# Patient Record
Sex: MALE | Race: WHITE | NOT HISPANIC OR LATINO | Employment: STUDENT | URBAN - METROPOLITAN AREA
[De-identification: names, ages, dates, MRNs, and addresses within clinical notes are randomized per-mention and may not be internally consistent; named-entity substitution may affect disease eponyms.]

---

## 2017-01-13 ENCOUNTER — OFFICE VISIT (OUTPATIENT)
Dept: ORTHOPEDICS | Facility: CLINIC | Age: 22
End: 2017-01-13

## 2017-01-13 VITALS
SYSTOLIC BLOOD PRESSURE: 150 MMHG | BODY MASS INDEX: 21.01 KG/M2 | DIASTOLIC BLOOD PRESSURE: 76 MMHG | WEIGHT: 169 LBS | HEIGHT: 75 IN

## 2017-01-13 DIAGNOSIS — M79.641 RIGHT HAND PAIN: Primary | ICD-10-CM

## 2017-01-13 NOTE — PROGRESS NOTES
" Subjective:   José Luis Palacios is a right handed 21 year old male who is here for right small finger pain. He states that in late 10/2016 he may have jammed or injured his right fifth digit playing ultimate Frisbee.  He was not seen, did not have an evaluation but has since noted some recent discomfort as he returned to more typical work-type activities.  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  He has done well with limited activity.  However, over Christmas break and more recently he has been doing more grasping and gripping and has noted he has had some discomfort in the right fifth digit MCP joint.  He denies any bruising, swelling or locking.     Background:   Date of injury:  none  Duration of symptoms: 3 months    Prior Evaluation: None    PAST MEDICAL, SOCIAL, SURGICAL AND FAMILY HISTORY: He  has a past medical history of Acne.  He  has past surgical history that includes no history of surgery.  His family history includes Breast Cancer in his paternal grandmother; Coronary Artery Disease in his paternal grandfather; Hypertension in his father; Skin Cancer in his mother.  He reports that he has never smoked. He does not have any smokeless tobacco history on file. He reports that he drinks alcohol. He reports that he does not use illicit drugs.    ALLERGIES: He has No Known Allergies.    CURRENT MEDICATIONS: He has a current medication list which includes the following prescription(s): doxycycline monohydrate, tretinoin, benzoyl peroxide, and clindamycin.     REVIEW OF SYSTEMS: 6 point review of systems is negative except as noted above.     Exam:   /76 mmHg  Ht 6' 2.5\" (1.892 m)  Wt 169 lb (76.658 kg)  BMI 21.41 kg/m2     CONSTITUTIONIAL: healthy, alert and no distress  GAIT: normal  PSYCHIATRIC: affect normal/bright and mentation appears normal.  RIGHT HAND:  Fifth digit:  He has no swelling and no deformity at the MCP joint, PIP joint or DIP joint.  He has no " valgus or varus pain or laxity with stress testing of the MCP joint at 0 or 35 degrees of flexion.  He has full active and passive range of motion at the DIP, PIP and MCP joints.  Ulnar nerve function is tested and intact.  Distal color, motor and sensory is intact.      ASSESSMENT:  Right fifth digit pain of unclear etiology.      PLAN:  He is reassured his radiographs and exam are normal.  He will follow up on a p.r.n. basis.

## 2017-01-13 NOTE — Clinical Note
"  1/13/2017      RE: José Luis Palacios  [address] [address] [address]        Subjective:   José Luis Palacios is a right handed 21 year old male who is here for right small finger pain. He states that in late 10/2016 he may have jammed or injured his right fifth digit playing ultimate Frisbee.  He was not seen, did not have an evaluation but has since noted some recent discomfort as he returned to more typical work-type activities.  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  He has done well with limited activity.  However, over Christmas break and more recently he has been doing more grasping and gripping and has noted he has had some discomfort in the right fifth digit MCP joint.  He denies any bruising, swelling or locking.     Background:   Date of injury:  none  Duration of symptoms: 3 months    Prior Evaluation: None    PAST MEDICAL, SOCIAL, SURGICAL AND FAMILY HISTORY: He  has a past medical history of Acne.  He  has past surgical history that includes no history of surgery.  His family history includes Breast Cancer in his paternal grandmother; Coronary Artery Disease in his paternal grandfather; Hypertension in his father; Skin Cancer in his mother.  He reports that he has never smoked. He does not have any smokeless tobacco history on file. He reports that he drinks alcohol. He reports that he does not use illicit drugs.    ALLERGIES: He has No Known Allergies.    CURRENT MEDICATIONS: He has a current medication list which includes the following prescription(s): doxycycline monohydrate, tretinoin, benzoyl peroxide, and clindamycin.     REVIEW OF SYSTEMS: 6 point review of systems is negative except as noted above.     Exam:   /76 mmHg  Ht 6' 2.5\" (1.892 m)  Wt 169 lb (76.658 kg)  BMI 21.41 kg/m2     CONSTITUTIONIAL: healthy, alert and no distress  GAIT: normal  PSYCHIATRIC: affect normal/bright and mentation appears normal.  RIGHT HAND:  Fifth digit:  " He has no swelling and no deformity at the MCP joint, PIP joint or DIP joint.  He has no valgus or varus pain or laxity with stress testing of the MCP joint at 0 or 35 degrees of flexion.  He has full active and passive range of motion at the DIP, PIP and MCP joints.  Ulnar nerve function is tested and intact.  Distal color, motor and sensory is intact.      ASSESSMENT:  Right fifth digit pain of unclear etiology.      PLAN:  He is reassured his radiographs and exam are normal.  He will follow up on a p.r.n. basis.           Terence Elliott MD

## 2017-01-13 NOTE — Clinical Note
Date:January 16, 2017      Patient was self referred, no letter generated. Do not send.        [facility] Health Information

## 2017-02-03 ENCOUNTER — OFFICE VISIT (OUTPATIENT)
Dept: URGENT CARE | Facility: URGENT CARE | Age: 22
End: 2017-02-03
Payer: COMMERCIAL

## 2017-02-03 VITALS
SYSTOLIC BLOOD PRESSURE: 150 MMHG | DIASTOLIC BLOOD PRESSURE: 78 MMHG | OXYGEN SATURATION: 100 % | BODY MASS INDEX: 21 KG/M2 | WEIGHT: 168.9 LBS | HEIGHT: 75 IN | HEART RATE: 67 BPM | TEMPERATURE: 98.2 F

## 2017-02-03 DIAGNOSIS — M76.61 TENDONITIS, ACHILLES, RIGHT: Primary | ICD-10-CM

## 2017-02-03 PROCEDURE — 99212 OFFICE O/P EST SF 10 MIN: CPT | Performed by: FAMILY MEDICINE

## 2017-02-03 NOTE — PATIENT INSTRUCTIONS
Apply ice for 15 minutes at a time, every 3-4 hours while awake.     Apply padding over the painful area to avoid friction and rubbing against the shoe edge.     Ibuprofen, Tylenol for the pain.     follow up with a podiatrist if not better in 2-3 weeks.     Avoid any shoes that rub too much against the heel.

## 2017-02-03 NOTE — MR AVS SNAPSHOT
After Visit Summary   2/3/2017    José Luis Palacios    MRN: [id number]           Patient Information     Date Of Birth          1995        Visit Information        Provider Department      2/3/2017 10:30 AM Tio Dennis MD [facility]        Today's Diagnoses     Tendonitis, Achilles, right    -  1       Care Instructions      Apply ice for 15 minutes at a time, every 3-4 hours while awake.     Apply padding over the painful area to avoid friction and rubbing against the shoe edge.     Ibuprofen, Tylenol for the pain.     follow up with a podiatrist if not better in 2-3 weeks.     Avoid any shoes that rub too much against the heel.              Follow-ups after your visit        Your next 10 appointments already scheduled     Feb 24, 2017  9:00 AM   (Arrive by 8:45 AM)   Return Visit with Ricardo Jimenez MD   [facility] Dermatology ([facility])    [address] [place] [id number] [phone]              Who to contact     If you have questions or need follow up information about today's clinic visit or your schedule please contact [facility] directly at [phone].  Normal or non-critical lab and imaging results will be communicated to you by MyChart, letter or phone within 4 business days after the clinic has received the results. If you do not hear from us within 7 days, please contact the clinic through MyChart or phone. If you have a critical or abnormal lab result, we will notify you by phone as soon as possible.  Submit refill requests through [employer] or call your pharmacy and they will forward the refill request to us. Please allow 3 business days for your refill to be completed.          Additional Information About Your Visit        MyChart Information     [employer] gives you secure access to your electronic health record. If you see a primary care provider, you can also send messages to your care  "team and make appointments. If you have questions, please call your primary care clinic.  If you do not have a primary care provider, please call [phone] and they will assist you.        Care EveryWhere ID     This is your Care EveryWhere ID. This could be used by other organizations to access your [facility] medical records  [id number]        Your Vitals Were     Pulse Temperature Height BMI (Body Mass Index) Pulse Oximetry       67 98.2  F (36.8  C) (Oral) 6' 2.5\" (1.892 m) 21.40 kg/m2 100%        Blood Pressure from Last 3 Encounters:   02/03/17 150/78   01/13/17 150/76   05/31/16 122/68    Weight from Last 3 Encounters:   02/03/17 168 lb 14.4 oz (76.613 kg)   01/13/17 169 lb (76.658 kg)   05/31/16 166 lb (75.297 kg)              Today, you had the following     No orders found for display         Today's Medication Changes          These changes are accurate as of: 2/3/17 10:53 AM.  If you have any questions, ask your nurse or doctor.               Stop taking these medicines if you haven't already. Please contact your care team if you have questions.     doxycycline Monohydrate 50 MG Caps capsule   Stopped by:  Tio Dennis MD                    Primary Care Provider    None Specified       No primary provider on file.        Thank you!     Thank you for choosing [facility] URGENT [facility]  for your care. Our goal is always to provide you with excellent care. Hearing back from our patients is one way we can continue to improve our services. Please take a few minutes to complete the written survey that you may receive in the mail after your visit with us. Thank you!             Your Updated Medication List - Protect others around you: Learn how to safely use, store and throw away your medicines at www.disposemymeds.org.          This list is accurate as of: 2/3/17 10:53 AM.  Always use your most recent med list.                   Brand Name Dispense Instructions for use    benzoyl peroxide 5 % Liqd      Use " daily as directed       clindamycin 1 % lotion    CLEOCIN T    60 mL    Apply topically 2 times daily       tretinoin 0.05 % cream    RETIN-A    45 g    Spread a pea size amount into affected area topically at bedtime.  Use sunscreen SPF>20.

## 2017-02-03 NOTE — PROGRESS NOTES
"SUBJECTIVE:  Chief Complaint   Patient presents with     Foot Injury     pt c/o right foot pain. has lump on right achilles. developed 2 days ago     José Luis Palacios is a 21 year old male presents with a chief complaint of a painful lump at the right posterior heel.  Patient has been wearing new tennis shoes since he received them on [place] day.   The lesion and pain occurred two days ago. . No obvious injury.  The patient complained of some pain  and has not had decreased ROM.  Pain exacerbated by walking with shoes on.  .  Relieved by not wearing shoes.  He treated it initially with Ice. This is the first time this type of injury has occurred to this patient.     Past Medical History   Diagnosis Date     Acne      Current Outpatient Prescriptions   Medication Sig Dispense Refill     tretinoin (RETIN-A) 0.05 % cream Spread a pea size amount into affected area topically at bedtime.  Use sunscreen SPF>20. 45 g 11     benzoyl peroxide 5 % LIQD Use daily as directed       clindamycin (CLEOCIN T) 1 % lotion Apply topically 2 times daily 60 mL 11     Social History   Substance Use Topics     Smoking status: Never Smoker      Smokeless tobacco: Never Used     Alcohol Use: 0.0 oz/week     0 Standard drinks or equivalent per week      Comment: 1-4x weekly       ROS:  Review of systems negative except as stated above.    EXAM:   /78 mmHg  Pulse 67  Temp(Src) 98.2  F (36.8  C) (Oral)  Ht 6' 2.5\" (1.892 m)  Wt 168 lb 14.4 oz (76.613 kg)  BMI 21.40 kg/m2  SpO2 100%  Gen: healthy,alert,no distress  Extremity: Right distal Achilles tendon has a tender, mildly edematous area overlying and slightly lateral to the this area of the Achilles tendon.  No fluctuance.  .   SKIN: there is mild erythema without no overlying cuts nor abrasions.      X-RAY was not done.      ASSESSMENT:   Right Heel Pain  Achilles Tendinitis of the right heel.     PLAN:  1) Ice, Padding over the painful area  2) Avoid shoes that rub " against the lesion  3) follow up with a podiatrist if not better in 2-3 weeks    Tio Dennis MD

## 2017-02-24 ENCOUNTER — OFFICE VISIT (OUTPATIENT)
Dept: DERMATOLOGY | Facility: CLINIC | Age: 22
End: 2017-02-24

## 2017-02-24 DIAGNOSIS — D22.9 MULTIPLE BENIGN MELANOCYTIC NEVI: ICD-10-CM

## 2017-02-24 DIAGNOSIS — L70.0 ACNE VULGARIS: Primary | ICD-10-CM

## 2017-02-24 DIAGNOSIS — Z12.83 SCREENING EXAM FOR SKIN CANCER: ICD-10-CM

## 2017-02-24 ASSESSMENT — PAIN SCALES - GENERAL: PAINLEVEL: NO PAIN (0)

## 2017-02-24 NOTE — LETTER
Date:March 22, 2017      Patient was self referred, no letter generated. Do not send.        [facility] Health Information

## 2017-02-24 NOTE — LETTER
2/24/2017       RE: José Luis Palacios  [address] [address] [address]     Dear Colleague,    Thank you for referring your patient, José Luis Palacios, to the [facility] DERMATOLOGY at [facility]. Please see a copy of my visit note below.    [facility] Dermatology Note    Dermatology Problem List:  1. Acne vulgaris  -benzoyl peroxide 5% face wash, initiated 8/23/16  -tretinoin 0.05% cream, initiated 8/23/16  -clindamycin 1% lotion, initiated 8/23/16  -doxycycline 50 mg bid, initiated 11/22/16, stopped 2/2017  2. Facial xerosis    Encounter Date: Feb 24, 2017    CC:  Chief Complaint   Patient presents with     Derm Problem     José Luis is here today for a skin check. no areas of concern.     History of Present Illness:  Mr. José Luis Palacios is a 21 year old male who presents as a follow up for acne.     The patient was last seen 3 months ago when he reported improvement but insufficient control of acne from his perspective. We agreed to a limited term course of oral antibiotic, DCN specifically. He did have pill esophagitis and discontinued, but was able to restart. He has since stopped this again as his skin was well controlled. He would like to continue topical medications.    He presents today also for a skin check. Denies painful, bleeding, nonhealing lesions. No new or changing nevi.    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 He has been living at home with his parents and dog this summer and will live near campus during the school year.    Family History:  There is a family history of skin cancer in the  patient's mother, patient not sure what type.    Medications:  Current Outpatient Prescriptions   Medication Sig Dispense Refill     tretinoin (RETIN-A) 0.05 % cream Spread a pea size amount into affected area topically at bedtime.  Use sunscreen SPF>20. 45 g 11     benzoyl peroxide 5 % LIQD Use daily as directed       clindamycin (CLEOCIN T) 1 % lotion Apply topically 2 times daily 60 mL 11     No Known Allergies    Review of Systems:  -Skin: As above in HPI. No additional skin concerns.    Physical exam:  Vitals: There were no vitals taken for this visit.  GEN: This is a well developed, well-nourished male in no acute distress, in a pleasant mood.   SKIN: Acne exam, which includes the face, neck, upper central chest, and upper central back was performed.  - There are post inflammatory macules on the shoulder  - Scattered excoriations and postinflammatory erythematous macules; no active papules, pustules, nodules or cysts on the face  - scattered junctional and compound nevi on the trunk and proximal extremities without clinical atypia; signature network phenotype is reticular.  - No other lesions of concern on areas examined.     Impression/Plan:  1. Acne vulgaris, comedonal and inflammatory - improved; transitioning to topical-only management  - Continue benzoyl peroxide 5% face wash when showering.  - Continue tretinoin 0.05% cream - apply a pea sized amount to a clean, dry face every other night   - Continue clindamycin 1% lotion - apply twice daily PRN inflammatory lesions, otherwise may stop this    2. Perinasal erythema and scaling, suspect xerotic dermatitis due to retinoid use (based on patient history).   - Resolved today.  - Continue non-comedogenic face lotion PRN.    3. Benign melanocytic nevi of the trunk  - reassurance provided; no lesions concerning for malignancy  - photoprotection (regular use of SPF30+ broad spectrum sunscreen and sun protective clothing) recommended  - ABCDE of melanoma  discussed    Follow-up in 6-12 months for acne follow up and annual skin check, earlier for new or changing lesions.    Ricardo Jimenez MD  Staff Dermatologist    Department of Dermatology      Again, thank you for allowing me to participate in the care of your patient.      Sincerely,    Ricardo Jimenez MD

## 2017-02-24 NOTE — NURSING NOTE
Chief Complaint   Patient presents with     Derm Problem     José Luis is here today for a skin check. no areas of concern.     Mannie Moraes, FIONAN

## 2017-02-24 NOTE — MR AVS SNAPSHOT
After Visit Summary   2/24/2017    José Luis Palacios    MRN: [id number]           Patient Information     Date Of Birth          1995        Visit Information        Provider Department      2/24/2017 9:00 AM Ricardo Jimenez MD [facility] Dermatology        Today's Diagnoses     Acne vulgaris    -  1    Multiple benign melanocytic nevi        Screening exam for skin cancer          Care Instructions    The ABCDEs of Melanoma    Skin cancer can develop anywhere on the skin. Ask someone for help when checking your skin, especially in hard to see places. If you notice a mole different from others, or that changes, enlarges, itches, or bleeds (even if it is small), you should see a dermatologist.                Follow-ups after your visit        Follow-up notes from your care team     Return in about 1 year (around 2/24/2018).      Who to contact     Please call your clinic at [phone] to:    Ask questions about your health    Make or cancel appointments    Discuss your medicines    Learn about your test results    Speak to your doctor   If you have compliments or concerns about an experience at your clinic, or if you wish to file a complaint, please contact [facility] Patient Relations at [phone] or email us at [email or web address]         Additional Information About Your Visit        MyChart Information     [employer] gives you secure access to your electronic health record. If you see a primary care provider, you can also send messages to your care team and make appointments. If you have questions, please call your primary care clinic.  If you do not have a primary care provider, please call [phone] and they will assist you.      [employer] is an electronic gateway that provides easy, online access to your medical records. With [employer], you can request a clinic appointment, read your test results, renew a prescription or communicate with your  care team.     To access your existing account, please contact your [facility] or call [phone] for assistance.        Care EveryWhere ID     This is your Care EveryWhere ID. This could be used by other organizations to access your [place] medical records  [id number]         Blood Pressure from Last 3 Encounters:   02/03/17 150/78   01/13/17 150/76   05/31/16 122/68    Weight from Last 3 Encounters:   02/03/17 76.6 kg (168 lb 14.4 oz)   01/13/17 76.7 kg (169 lb)   05/31/16 75.3 kg (166 lb)              Today, you had the following     No orders found for display       Primary Care Provider    None Specified       No primary provider on file.        Thank you!     Thank you for choosing [facility] DERMATOLOGY  for your care. Our goal is always to provide you with excellent care. Hearing back from our patients is one way we can continue to improve our services. Please take a few minutes to complete the written survey that you may receive in the mail after your visit with us. Thank you!             Your Updated Medication List - Protect others around you: Learn how to safely use, store and throw away your medicines at www.disposemymeds.org.          This list is accurate as of: 2/24/17 11:59 PM.  Always use your most recent med list.                   Brand Name Dispense Instructions for use    benzoyl peroxide 5 % Liqd      Use daily as directed       clindamycin 1 % lotion    CLEOCIN T    60 mL    Apply topically 2 times daily       tretinoin 0.05 % cream    RETIN-A    45 g    Spread a pea size amount into affected area topically at bedtime.  Use sunscreen SPF>20.

## 2017-02-24 NOTE — PATIENT INSTRUCTIONS
The ABCDEs of Melanoma    Skin cancer can develop anywhere on the skin. Ask someone for help when checking your skin, especially in hard to see places. If you notice a mole different from others, or that changes, enlarges, itches, or bleeds (even if it is small), you should see a dermatologist.

## 2017-03-22 NOTE — PROGRESS NOTES
[facility] Dermatology Note    Dermatology Problem List:  1. Acne vulgaris  -benzoyl peroxide 5% face wash, initiated 8/23/16  -tretinoin 0.05% cream, initiated 8/23/16  -clindamycin 1% lotion, initiated 8/23/16  -doxycycline 50 mg bid, initiated 11/22/16, stopped 2/2017  2. Facial xerosis    Encounter Date: Feb 24, 2017    CC:  Chief Complaint   Patient presents with     Derm Problem     José Luis is here today for a skin check. no areas of concern.     History of Present Illness:  Mr. José Luis Palacios is a 21 year old male who presents as a follow up for acne.     The patient was last seen 3 months ago when he reported improvement but insufficient control of acne from his perspective. We agreed to a limited term course of oral antibiotic, DCN specifically. He did have pill esophagitis and discontinued, but was able to restart. He has since stopped this again as his skin was well controlled. He would like to continue topical medications.    He presents today also for a skin check. Denies painful, bleeding, nonhealing lesions. No new or changing nevi.    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 He has been living at home with his parents and dog this summer and will live near campus during the school year.    Family History:  There is a family history of skin cancer in the patient's mother, patient not sure what type.    Medications:  Current Outpatient Prescriptions   Medication Sig Dispense Refill     tretinoin (RETIN-A) 0.05 % cream Spread a pea size amount into affected area topically at bedtime.  Use sunscreen SPF>20. 45 g 11     benzoyl peroxide 5 % LIQD Use daily  as directed       clindamycin (CLEOCIN T) 1 % lotion Apply topically 2 times daily 60 mL 11     No Known Allergies    Review of Systems:  -Skin: As above in HPI. No additional skin concerns.    Physical exam:  Vitals: There were no vitals taken for this visit.  GEN: This is a well developed, well-nourished male in no acute distress, in a pleasant mood.   SKIN: Acne exam, which includes the face, neck, upper central chest, and upper central back was performed.  - There are post inflammatory macules on the shoulder  - Scattered excoriations and postinflammatory erythematous macules; no active papules, pustules, nodules or cysts on the face  - scattered junctional and compound nevi on the trunk and proximal extremities without clinical atypia; signature network phenotype is reticular.  - No other lesions of concern on areas examined.     Impression/Plan:  1. Acne vulgaris, comedonal and inflammatory - improved; transitioning to topical-only management  - Continue benzoyl peroxide 5% face wash when showering.  - Continue tretinoin 0.05% cream - apply a pea sized amount to a clean, dry face every other night   - Continue clindamycin 1% lotion - apply twice daily PRN inflammatory lesions, otherwise may stop this    2. Perinasal erythema and scaling, suspect xerotic dermatitis due to retinoid use (based on patient history).   - Resolved today.  - Continue non-comedogenic face lotion PRN.    3. Benign melanocytic nevi of the trunk  - reassurance provided; no lesions concerning for malignancy  - photoprotection (regular use of SPF30+ broad spectrum sunscreen and sun protective clothing) recommended  - ABCDE of melanoma discussed    Follow-up in 6-12 months for acne follow up and annual skin check, earlier for new or changing lesions.    Ricardo Jimenez MD  Staff Dermatologist    Department of Dermatology

## 2017-06-07 ENCOUNTER — MEDICAL CORRESPONDENCE (OUTPATIENT)
Dept: HEALTH INFORMATION MANAGEMENT | Facility: CLINIC | Age: 22
End: 2017-06-07

## 2017-06-07 ENCOUNTER — TRANSFERRED RECORDS (OUTPATIENT)
Dept: HEALTH INFORMATION MANAGEMENT | Facility: CLINIC | Age: 22
End: 2017-06-07

## 2017-12-31 ENCOUNTER — HEALTH MAINTENANCE LETTER (OUTPATIENT)
Age: 22
End: 2017-12-31

## 2018-08-30 ENCOUNTER — TELEPHONE (OUTPATIENT)
Dept: OPHTHALMOLOGY | Facility: CLINIC | Age: 23
End: 2018-08-30

## 2018-10-05 ENCOUNTER — APPOINTMENT (OUTPATIENT)
Dept: OPTOMETRY | Facility: CLINIC | Age: 23
End: 2018-10-05

## 2018-10-05 ENCOUNTER — OFFICE VISIT (OUTPATIENT)
Dept: OPTOMETRY | Facility: CLINIC | Age: 23
End: 2018-10-05
Payer: COMMERCIAL

## 2018-10-05 DIAGNOSIS — H52.13 MYOPIA, BILATERAL: Primary | ICD-10-CM

## 2018-10-05 ASSESSMENT — SLIT LAMP EXAM - LIDS
COMMENTS: NORMAL
COMMENTS: NORMAL

## 2018-10-05 ASSESSMENT — REFRACTION_WEARINGRX
OD_CYLINDER: SPHERE
OD_SPHERE: -1.00
OS_CYLINDER: SPHERE
OS_SPHERE: -1.25

## 2018-10-05 ASSESSMENT — CONF VISUAL FIELD
OD_NORMAL: 1
OS_NORMAL: 1
METHOD: COUNTING FINGERS

## 2018-10-05 ASSESSMENT — VISUAL ACUITY
CORRECTION_TYPE: GLASSES
OD_CC: 20/20
METHOD: SNELLEN - LINEAR
OS_CC: 20/20

## 2018-10-05 ASSESSMENT — CUP TO DISC RATIO
OS_RATIO: 0.15
OD_RATIO: 0.15

## 2018-10-05 ASSESSMENT — EXTERNAL EXAM - RIGHT EYE: OD_EXAM: NORMAL

## 2018-10-05 ASSESSMENT — REFRACTION_CURRENTRX
OS_BASECURVE: 8.5
OD_BRAND: ACUVUE OASYS 1 DAY
OS_CYLINDER: SPHERE
OS_BRAND: ACUVUE OASYS 1 DAY
OD_BASECURVE: 8.5
OD_SPHERE: -1.00
OS_SPHERE: -1.25
OD_DIAMETER: 14.3
OD_CYLINDER: SPHERE
OS_DIAMETER: 14.3

## 2018-10-05 ASSESSMENT — REFRACTION_MANIFEST
OS_CYLINDER: SPHERE
OS_SPHERE: -1.25
OD_CYLINDER: SPHERE
OD_SPHERE: -1.00

## 2018-10-05 ASSESSMENT — TONOMETRY
OS_IOP_MMHG: 17
OD_IOP_MMHG: 17
IOP_METHOD: ICARE

## 2018-10-05 ASSESSMENT — EXTERNAL EXAM - LEFT EYE: OS_EXAM: NORMAL

## 2018-10-05 NOTE — MR AVS SNAPSHOT
After Visit Summary   10/5/2018    José Luis Palacios    MRN: [id number]           Patient Information     Date Of Birth          1995        Visit Information        Provider Department      10/5/2018 11:00 AM Leighann Ayala, [facility]        Today's Diagnoses     Myopia, bilateral    -  1       Follow-ups after your visit        Who to contact     Please call your clinic at [phone] to:    Ask questions about your health    Make or cancel appointments    Discuss your medicines    Learn about your test results    Speak to your doctor            Additional Information About Your Visit        [employer] Information     OtherInbox gives you secure access to your electronic health record. If you see a primary care provider, you can also send messages to your care team and make appointments. If you have questions, please call your primary care clinic.  If you do not have a primary care provider, please call [phone] and they will assist you.      OtherInbox is an electronic gateway that provides easy, online access to your medical records. With OtherInbox, you can request a clinic appointment, read your test results, renew a prescription or communicate with your care team.     To access your existing account, please contact your [facility] or call [phone] for assistance.        Care EveryWhere ID     This is your Care EveryWhere ID. This could be used by other organizations to access your [place] medical records  [id number]         Blood Pressure from Last 3 Encounters:   02/03/17 150/78   01/13/17 150/76   05/31/16 122/68    Weight from Last 3 Encounters:   02/03/17 76.6 kg (168 lb 14.4 oz)   01/13/17 76.7 kg (169 lb)   05/31/16 75.3 kg (166 lb)              We Performed the Following     HC CONTACT LENS FITTING COSMETIC LVL 1 (42124.011)        Primary Care Provider    None Specified       No primary provider on file.        Equal Access to  Services     [facility]: Hadii deni Henderson, wabipinda luqadaha, qaybta kamiladisnabil solomon, phani basurto. So [facility] [phone].    ATENCIÓN: Si habla brian, tiene a castillo disposición servicios gratuitos de asistencia lingüística. Llame al [phone].    We comply with applicable federal civil rights laws and [place] laws. We do not discriminate on the basis of race, color, national origin, age, disability, sex, sexual orientation, or gender identity.            Thank you!     Thank you for choosing [facility]  for your care. Our goal is always to provide you with excellent care. Hearing back from our patients is one way we can continue to improve our services. Please take a few minutes to complete the written survey that you may receive in the mail after your visit with us. Thank you!             Your Updated Medication List - Protect others around you: Learn how to safely use, store and throw away your medicines at www.disposemymeds.org.          This list is accurate as of 10/5/18 11:59 PM.  Always use your most recent med list.                   Brand Name Dispense Instructions for use Diagnosis    benzoyl peroxide 5 % Liqd      Use daily as directed    Acne vulgaris       clindamycin 1 % lotion    CLEOCIN T    60 mL    Apply topically 2 times daily    Acne vulgaris       tretinoin 0.05 % cream    RETIN-A    45 g    Spread a pea size amount into affected area topically at bedtime.  Use sunscreen SPF>20.    Acne vulgaris

## 2018-10-08 NOTE — PROGRESS NOTES
A/P  1.) Myopia each eye  -Stable Rx, continue with current glasses  -Refit to AV Oasys 1 Day with good vision/comfort/fit each eye  -Dilated ocular health unremarkable each eye    Monitor 1-2 years routine, sooner prn    I have confirmed the patient's CC, HPI and reviewed Past Medical History, Past Surgical History, Social History, Family History, Problem List, Medication List and agree with Tech note.     Leighann Ayala, OD FAAO

## 2019-04-25 ENCOUNTER — MEDICAL CORRESPONDENCE (OUTPATIENT)
Dept: HEALTH INFORMATION MANAGEMENT | Facility: CLINIC | Age: 24
End: 2019-04-25

## 2019-04-25 DIAGNOSIS — Z79.899 POLYPHARMACY: Primary | ICD-10-CM

## 2019-04-25 DIAGNOSIS — L70.0 ACNE VULGARIS: ICD-10-CM

## 2019-05-01 DIAGNOSIS — L70.0 ACNE VULGARIS: ICD-10-CM

## 2019-05-01 DIAGNOSIS — Z79.899 POLYPHARMACY: ICD-10-CM

## 2019-05-01 LAB
ALBUMIN SERPL-MCNC: 3.9 G/DL (ref 3.4–5)
ALP SERPL-CCNC: 77 U/L (ref 40–150)
ALT SERPL W P-5'-P-CCNC: 23 U/L (ref 0–70)
AST SERPL W P-5'-P-CCNC: 25 U/L (ref 0–45)
BASOPHILS # BLD AUTO: 0.1 10E9/L (ref 0–0.2)
BASOPHILS NFR BLD AUTO: 0.9 %
BILIRUB DIRECT SERPL-MCNC: 0.1 MG/DL (ref 0–0.2)
BILIRUB SERPL-MCNC: 0.4 MG/DL (ref 0.2–1.3)
CHOLEST SERPL-MCNC: 113 MG/DL
DIFFERENTIAL METHOD BLD: NORMAL
EOSINOPHIL # BLD AUTO: 0.2 10E9/L (ref 0–0.7)
EOSINOPHIL NFR BLD AUTO: 4.2 %
ERYTHROCYTE [DISTWIDTH] IN BLOOD BY AUTOMATED COUNT: 12 % (ref 10–15)
HCT VFR BLD AUTO: 48.9 % (ref 40–53)
HDLC SERPL-MCNC: 38 MG/DL
HGB BLD-MCNC: 15.7 G/DL (ref 13.3–17.7)
IMM GRANULOCYTES # BLD: 0 10E9/L (ref 0–0.4)
IMM GRANULOCYTES NFR BLD: 0.2 %
LDLC SERPL CALC-MCNC: 67 MG/DL
LYMPHOCYTES # BLD AUTO: 2 10E9/L (ref 0.8–5.3)
LYMPHOCYTES NFR BLD AUTO: 35.3 %
MCH RBC QN AUTO: 28.5 PG (ref 26.5–33)
MCHC RBC AUTO-ENTMCNC: 32.1 G/DL (ref 31.5–36.5)
MCV RBC AUTO: 89 FL (ref 78–100)
MONOCYTES # BLD AUTO: 0.5 10E9/L (ref 0–1.3)
MONOCYTES NFR BLD AUTO: 8.3 %
NEUTROPHILS # BLD AUTO: 3 10E9/L (ref 1.6–8.3)
NEUTROPHILS NFR BLD AUTO: 51.1 %
NONHDLC SERPL-MCNC: 75 MG/DL
NRBC # BLD AUTO: 0 10*3/UL
NRBC BLD AUTO-RTO: 0 /100
PLATELET # BLD AUTO: 209 10E9/L (ref 150–450)
PROT SERPL-MCNC: 7.2 G/DL (ref 6.8–8.8)
RBC # BLD AUTO: 5.51 10E12/L (ref 4.4–5.9)
TRIGL SERPL-MCNC: 42 MG/DL
WBC # BLD AUTO: 5.8 10E9/L (ref 4–11)

## 2019-06-04 ENCOUNTER — MEDICAL CORRESPONDENCE (OUTPATIENT)
Dept: HEALTH INFORMATION MANAGEMENT | Facility: CLINIC | Age: 24
End: 2019-06-04

## 2019-06-04 DIAGNOSIS — L70.9 ACNE: ICD-10-CM

## 2019-06-04 DIAGNOSIS — L70.0 ACNE VULGARIS: Primary | ICD-10-CM

## 2019-07-12 DIAGNOSIS — L70.0 ACNE VULGARIS: ICD-10-CM

## 2019-07-12 DIAGNOSIS — L70.9 ACNE: ICD-10-CM

## 2019-07-12 LAB
ALBUMIN SERPL-MCNC: 4.2 G/DL (ref 3.4–5)
ALP SERPL-CCNC: 72 U/L (ref 40–150)
ALT SERPL W P-5'-P-CCNC: 22 U/L (ref 0–70)
AST SERPL W P-5'-P-CCNC: 31 U/L (ref 0–45)
BILIRUB DIRECT SERPL-MCNC: 0.2 MG/DL (ref 0–0.2)
BILIRUB SERPL-MCNC: 0.8 MG/DL (ref 0.2–1.3)
CHOLEST SERPL-MCNC: 118 MG/DL
HDLC SERPL-MCNC: 43 MG/DL
LDLC SERPL CALC-MCNC: 66 MG/DL
NONHDLC SERPL-MCNC: 75 MG/DL
PROT SERPL-MCNC: 7.6 G/DL (ref 6.8–8.8)
TRIGL SERPL-MCNC: 48 MG/DL

## 2019-10-03 ENCOUNTER — MEDICAL CORRESPONDENCE (OUTPATIENT)
Dept: HEALTH INFORMATION MANAGEMENT | Facility: CLINIC | Age: 24
End: 2019-10-03

## 2019-10-03 DIAGNOSIS — L70.9 ACNE, UNSPECIFIED ACNE TYPE: Primary | ICD-10-CM

## 2019-10-18 DIAGNOSIS — L70.9 ACNE, UNSPECIFIED ACNE TYPE: ICD-10-CM

## 2019-10-18 LAB
ALBUMIN SERPL-MCNC: 4 G/DL (ref 3.4–5)
ALP SERPL-CCNC: 77 U/L (ref 40–150)
ALT SERPL W P-5'-P-CCNC: 21 U/L (ref 0–70)
AST SERPL W P-5'-P-CCNC: 20 U/L (ref 0–45)
BILIRUB DIRECT SERPL-MCNC: 0.1 MG/DL (ref 0–0.2)
BILIRUB SERPL-MCNC: 0.5 MG/DL (ref 0.2–1.3)
CHOLEST SERPL-MCNC: 121 MG/DL
ERYTHROCYTE [DISTWIDTH] IN BLOOD BY AUTOMATED COUNT: 12 % (ref 10–15)
HCT VFR BLD AUTO: 50 % (ref 40–53)
HDLC SERPL-MCNC: 46 MG/DL
HGB BLD-MCNC: 16.2 G/DL (ref 13.3–17.7)
LDLC SERPL CALC-MCNC: 65 MG/DL
MCH RBC QN AUTO: 29 PG (ref 26.5–33)
MCHC RBC AUTO-ENTMCNC: 32.4 G/DL (ref 31.5–36.5)
MCV RBC AUTO: 90 FL (ref 78–100)
NONHDLC SERPL-MCNC: 76 MG/DL
PLATELET # BLD AUTO: 218 10E9/L (ref 150–450)
PROT SERPL-MCNC: 7.5 G/DL (ref 6.8–8.8)
RBC # BLD AUTO: 5.58 10E12/L (ref 4.4–5.9)
TRIGL SERPL-MCNC: 53 MG/DL
WBC # BLD AUTO: 7.2 10E9/L (ref 4–11)

## 2020-01-20 NOTE — PROGRESS NOTES
3  SUBJECTIVE:   CC: José Luis Palacios is an 24 year old male who presents for preventive health visit.     Healthy Habits:    Do you get at least three servings of calcium containing foods daily (dairy, green leafy vegetables, etc.)? yes    Amount of exercise or daily activities, outside of work: 5 day(s) per week    Problems taking medications regularly No    Medication side effects: No    Have you had an eye exam in the past two years? yes    Do you see a dentist twice per year? yes    Do you have sleep apnea, excessive snoring or daytime drowsiness?no      Encounter Diagnoses   Name Primary?     Routine general medical examination at a health care facility Yes     White coat syndrome without hypertension, BP normal at work/ home      Post-nasal drainage daily, tried antihist. / PPI withno relief      Acne vulgaris, Well controlled onmeds normal labs          Today's PHQ-2 Score:   PHQ-2 ( 1999 Pfizer) 1/21/2020   Q1: Little interest or pleasure in doing things 0   Q2: Feeling down, depressed or hopeless 0   PHQ-2 Score 0       Abuse: Current or Past(Physical, Sexual or Emotional)- No  Do you feel safe in your environment? Yes    Sexually active, one female partner times 2 years, she has IUD    Social History     Tobacco Use     Smoking status: Never Smoker     Smokeless tobacco: Never Used   Substance Use Topics     Alcohol use: Yes     Alcohol/week: 0.0 standard drinks     Comment: 1-4x weekly     If you drink alcohol do you typically have >3 drinks per day or >7 drinks per week? No- only the weekends not working does drink 4-5 a night                       Last PSA: No results found for: PSA    Reviewed orders with patient. Reviewed health maintenance and updated orders accordingly - Yes  Lab work is in process    Reviewed and updated as needed this visit by clinical staff  Tobacco  Allergies  Meds  Med Hx  Surg Hx  Fam Hx  Soc Hx        Reviewed and updated as needed this visit by Provider       "  Past Medical History:   Diagnosis Date     Acne         ROS:  CONSTITUTIONAL: NEGATIVE for fever, chills, change in weight  INTEGUMENTARY/SKIN: NEGATIVE for worrisome rashes, moles or lesions  EYES: NEGATIVE for vision changes or irritation  ENT: NEGATIVE for ear, mouth and throat problems  RESP: NEGATIVE for significant cough or SOB  CV: NEGATIVE for chest pain, palpitations or peripheral edema  GI: NEGATIVE for nausea, abdominal pain, heartburn, or change in bowel habits   male: negative for dysuria, hematuria, decreased urinary stream, erectile dysfunction, urethral discharge  MUSCULOSKELETAL: NEGATIVE for significant arthralgias or myalgia  NEURO: NEGATIVE for weakness, dizziness or paresthesias  PSYCHIATRIC: NEGATIVE for changes in mood or affect    OBJECTIVE:   /67   Pulse 79   Temp 96.8  F (36  C) (Oral)   Ht 1.89 m (6' 2.41\")   Wt 76.3 kg (168 lb 4.8 oz)   SpO2 99%   BMI 21.37 kg/m    EXAM:  GENERAL: healthy, alert and no distress  EYES: Eyes grossly normal to inspection, PERRL and conjunctivae and sclerae normal  HENT: ear canals and TM's normal, nose and mouth without ulcers or lesions  NECK: no adenopathy, no asymmetry, masses, or scars and thyroid normal to palpation  RESP: lungs clear to auscultation - no rales, rhonchi or wheezes  CV: regular rate and rhythm, normal S1 S2, no S3 or S4, no murmur, click or rub, no peripheral edema and peripheral pulses strong  ABDOMEN: soft, nontender, no hepatosplenomegaly, no masses and bowel sounds normal   (male): normal male genitalia without lesions or urethral discharge, no hernia  MS: no gross musculoskeletal defects noted, no edema  SKIN: no suspicious lesions or rashes  NEURO: Normal strength and tone, mentation intact and speech normal  PSYCH: mentation appears normal, affect normal/bright  LYMPH: no cervical, supraclavicular, axillary, or inguinal adenopathy    Diagnostic Test Results:  Labs reviewed in Epic    ASSESSMENT/PLAN:   1. Routine " "general medical examination at a health care facility  In good health    2. White coat syndrome without hypertension  Check at home    3. Post-nasal drainage  try  - fluticasone (FLONASE) 50 MCG/ACT nasal spray; Spray 1 spray into both nostrils daily  Dispense: 16 g; Refill: 1  - ALLERGY/ASTHMA ADULT REFERRAL    4. Acne vulgaris   Well controlled   Follow up with consultant as planned.       COUNSELING:  Reviewed preventive health counseling, as reflected in patient instructions       Regular exercise       Healthy diet/nutrition       Vision screening       Hearing screening       Safe sex practices/STD prevention    Estimated body mass index is 21.37 kg/m  as calculated from the following:    Height as of this encounter: 1.89 m (6' 2.41\").    Weight as of this encounter: 76.3 kg (168 lb 4.8 oz).         reports that he has never smoked. He has never used smokeless tobacco.      Counseling Resources:  ATP IV Guidelines  Pooled Cohorts Equation Calculator  FRAX Risk Assessment  ICSI Preventive Guidelines  Dietary Guidelines for Americans, 2010  USDA's MyPlate  ASA Prophylaxis  Lung CA Screening    Edgar Guy MD  [facility]  "

## 2020-01-20 NOTE — PATIENT INSTRUCTIONS
Preventive Health Recommendations  Male Ages 21 - 25     Yearly exam:             See your health care provider every year in order to  o   Review health changes.   o   Discuss preventive care.    o   Review your medicines if your doctor has prescribed any.    You should be tested each year for STDs (sexually transmitted diseases).     Talk to your provider about cholesterol testing.      If you are at risk for diabetes, you should have a diabetes test (fasting glucose).    Shots: Get a flu shot each year. Get a tetanus shot every 10 years.     Nutrition:    Eat at least 5 servings of fruits and vegetables daily.     Eat whole-grain bread, whole-wheat pasta and brown rice instead of white grains and rice.     Get adequate calcium and Vitamin D.     Lifestyle    Exercise for at least 150 minutes a week (30 minutes a day, 5 days a week). This will help you control your weight and prevent disease.     Limit alcohol to one drink per day.     No smoking.     Wear sunscreen to prevent skin cancer.     See your dentist every six months for an exam and cleaning.      Pt c/o cold sxs since last week. Reports \"Dry\" cough, sore throat, chest congestion. Denies runny nose. Uncertain of fever. Pt reports hx pneumonia. Pt also c/o \"I think I have a hernia above bully button\" x2 weeks, states he has not been diagnosed.

## 2020-01-21 ENCOUNTER — OFFICE VISIT (OUTPATIENT)
Dept: FAMILY MEDICINE | Facility: CLINIC | Age: 25
End: 2020-01-21
Payer: COMMERCIAL

## 2020-01-21 VITALS
OXYGEN SATURATION: 99 % | DIASTOLIC BLOOD PRESSURE: 67 MMHG | SYSTOLIC BLOOD PRESSURE: 136 MMHG | WEIGHT: 168.3 LBS | BODY MASS INDEX: 21.6 KG/M2 | HEART RATE: 79 BPM | HEIGHT: 74 IN | TEMPERATURE: 96.8 F

## 2020-01-21 DIAGNOSIS — R03.0 WHITE COAT SYNDROME WITHOUT HYPERTENSION: ICD-10-CM

## 2020-01-21 DIAGNOSIS — Z00.00 ROUTINE GENERAL MEDICAL EXAMINATION AT A HEALTH CARE FACILITY: Primary | ICD-10-CM

## 2020-01-21 DIAGNOSIS — R09.82 POST-NASAL DRAINAGE: ICD-10-CM

## 2020-01-21 DIAGNOSIS — L70.0 ACNE VULGARIS: ICD-10-CM

## 2020-01-21 PROCEDURE — 99395 PREV VISIT EST AGE 18-39: CPT | Performed by: FAMILY MEDICINE

## 2020-01-21 RX ORDER — FLUTICASONE PROPIONATE 50 MCG
1 SPRAY, SUSPENSION (ML) NASAL DAILY
Qty: 16 G | Refills: 1 | Status: SHIPPED | OUTPATIENT
Start: 2020-01-21 | End: 2024-04-15

## 2020-01-21 ASSESSMENT — MIFFLIN-ST. JEOR: SCORE: 1834.64

## 2020-02-06 ENCOUNTER — MYC MEDICAL ADVICE (OUTPATIENT)
Dept: FAMILY MEDICINE | Facility: CLINIC | Age: 25
End: 2020-02-06

## 2020-02-06 ENCOUNTER — TELEPHONE (OUTPATIENT)
Dept: ALLERGY | Facility: CLINIC | Age: 25
End: 2020-02-06

## 2020-02-06 NOTE — TELEPHONE ENCOUNTER
[facility] Call Center    Phone Message    May a detailed message be left on voicemail: yes     Reason for Call: Other: Pt referred to be seen in allergy for dx: post nasal drainage. Dx not listed in protocol and writer unsure of how to schedule. Pt states that he's been experiencing constant Phlegm and mucous in his throat for a year or so. Please call pt back to assist.     Action Taken: Message routed to:  [facility] ([facility]): allergy    Travel Screening: Not Applicable

## 2020-02-06 NOTE — TELEPHONE ENCOUNTER
Wrote patient back on [place] with Allergy Clinic information. Renu Carter RN on 2/6/2020 at 1:07 PM

## 2020-02-11 NOTE — TELEPHONE ENCOUNTER
FUTURE VISIT INFORMATION      FUTURE VISIT INFORMATION:    Date: 2.26.20    Time: 9:00    Location:  Allergy  REFERRAL INFORMATION:    Referring provider:  Dr. Edgar Guy    Referring providers clinic:  [facility]    Reason for visit/diagnosis      RECORDS REQUESTED FROM:       Clinic name Comments Records Status Imaging Status   [facility] 1.21.20 Dr. Guy [facility] N/A

## 2020-02-26 ENCOUNTER — PRE VISIT (OUTPATIENT)
Dept: ALLERGY | Facility: CLINIC | Age: 25
End: 2020-02-26

## 2020-02-26 ENCOUNTER — OFFICE VISIT (OUTPATIENT)
Dept: ALLERGY | Facility: CLINIC | Age: 25
End: 2020-02-26
Payer: COMMERCIAL

## 2020-02-26 DIAGNOSIS — J30.9 ALLERGIC RHINOCONJUNCTIVITIS: Primary | ICD-10-CM

## 2020-02-26 DIAGNOSIS — J30.9 ALLERGIC RHINITIS WITH POSTNASAL DRIP: ICD-10-CM

## 2020-02-26 DIAGNOSIS — R09.82 ALLERGIC RHINITIS WITH POSTNASAL DRIP: ICD-10-CM

## 2020-02-26 DIAGNOSIS — H10.10 ALLERGIC RHINOCONJUNCTIVITIS: Primary | ICD-10-CM

## 2020-02-26 ASSESSMENT — PAIN SCALES - GENERAL: PAINLEVEL: NO PAIN (0)

## 2020-02-26 NOTE — PATIENT INSTRUCTIONS
House Dust Mite Allergy        The house dust mite is an arachnid about 0.3 mm in size and not visible to the naked eye. There are around 150 species of house dust mites in the world. One mite produces up to 40 fecal droppings a day. One teaspoonful of bedroom dust contains an average of nearly 1000 mites and 250,000 minute droppings.    Causes and triggers of house dust mite allergy  The house dust mite requires a warm, moist environment without light in order to live and reproduce. Our beds are ideal. The mite feeds on human and animal skin scales. The allergen is mainly contained in the mite's feces. The feces contain allergy-triggering constituents which are spread in fine dust, are breathed in and can cause an allergic reaction.    Symptoms  When the allergens come into contact with the mucous membranes in the eyes, nose, mouth and throat, sufferers develop symptoms typical of an allergic cold (allergic rhinitis) or an allergic inflammation of the conjunctiva (allergic conjunctivitis): blocked or runny nose, sneezing, red, itchy eyes. If all of these symptoms are present, then the condition is also known as rhinoconjunctivitis. Often, the upper respiratory tract becomes chronically inflamed, primarily because house dust mites are present all year round.  The symptoms of house dust mite allergy typically occur in the morning and are more frequent in the cold months of the year.    Therapy and treatment  As a first step, mattress, pillows and duvet/comforter should be placed in mite-proof or anti-mite covers, sometimes known as encasings. Alternatively you can use pillows or comforter that can be washed at over 130 F monthly. At the same time, house dust should be minimized. If necessary, the symptoms can be treated with medication, for example antihistamines in the form of nasal sprays, eye drops and tablets. Desensitization/specific immunotherapy (SIT) is recommended for house dust allergy if all the measures  "above are not sufficient.    Tips and tricks:    Keep room temperature at 66-70 F and relative air humidity at a maximum of 50%.    Ideally, thoroughly air your home two to three times a day for 5 to 10 minutes each time.    Wash bed linens in at least 130 F every week.    Remove stuffed animals or freeze them every other week.    Keep ceiling fans off in the bedroom as they can stir up dust mite allergens.    Remove dust from furniture with a damp cloth and regularly wet mop floors.    Do not put pot and hydroponic plants in the bedroom and also avoid putting too many in living areas, as they increase room humidity.    When staying overnight in other accommodations, we recommend taking your own bed linen and the above anti-mite mattress covers with you.    Remove upholstered furniture from the bedroom and consider removing the carpets. Ideally, use sealed parquet or laminate ricky, cork tiles or ricky made of wood, novilon or PVC.    Maybe additionally reduce dust mites in mattress, upholstery, or ricky using hot steam .      Modified from \"House Dust Mite Allergy\" by [employer] [facility].    "

## 2020-02-26 NOTE — NURSING NOTE
Chief Complaint   Patient presents with     Allergy Consult     José Luis is here today for an Allergy Consult. Been two years of thick mucus in the back of the throat. Happens a lot at night. Stated there's no pain just uncomfortable. Thought it was the house he was living in and then moved to a nicer and new home but still is present.     JUAN DIEGO CRISTINA on 2/26/2020 at 8:59 AM

## 2020-02-26 NOTE — PROGRESS NOTES
[facility] Allergy Note      Allergy Problem List:    Specialty Problems        Allergy Diagnoses    Acne vulgaris              CC:   Allergy Consult (José Luis is here today for an Allergy Consult. Been two years of thick mucus in the back of the throat. Happens a lot at night. Stated there's no pain just uncomfortable. Thought it was the house he was living in and then moved to a nicer and new home but still is present.)        Encounter Date: Feb 26, 2020    History of Present Illness:  Mr. José Luis Palacios is a 24 year old male who presents as a referral from Self.    Since about 2 years mostly night and morning nasal inflammation, postnasal drip, less congestion, no conjunctivitis and no coughing/asthma    Patient has as well in April/Mai since 5-6 itchy eyes and some Rhinitis.    Patient was using 2-3 weeks Flonase = no change  Antihistamines not really taken and if taken no big difference and Ranitidine did not change as well     Patient has Halo Nevi    Past Medical History:   Patient Active Problem List   Diagnosis     CARDIOVASCULAR SCREENING; LDL GOAL LESS THAN 160     Acne vulgaris     White coat syndrome without hypertension     Past Medical History:   Diagnosis Date     Acne        Allergy History:   No Known Allergies    Sister and mother has some reactions to cat and dogs. Patient not (grew up with pets)    Social History:  Patient is nurse in pediatric cardiovascular ICU     reports that he has never smoked. He has never used smokeless tobacco. He reports current alcohol use. He reports that he does not use drugs.      Family History:  Family History   Problem Relation Age of Onset     Hypertension Father      Breast Cancer Paternal Grandmother      Coronary Artery Disease Paternal Grandfather         heart attack     Skin Cancer Mother      Suicide Other        Medications:  Current Outpatient Medications   Medication Sig Dispense Refill     tretinoin (RETIN-A) 0.05 %  cream Spread a pea size amount into affected area topically at bedtime.  Use sunscreen SPF>20. 45 g 11     fluticasone (FLONASE) 50 MCG/ACT nasal spray Spray 1 spray into both nostrils daily (Patient not taking: Reported on 2/26/2020) 16 g 1           Review of Systems:  -As per HPI  -Constitutional: The patient denies fatigue, fevers, chills, unintended weight loss, and night sweats.  -HEENT: Patient denies nonhealing oral sores.  -Skin: As above in HPI. No additional skin concerns.    Physical exam:  Vitals: There were no vitals taken for this visit.  GEN: {[id number]}   Not relevant    -No other lesions of concern on areas examined.     Allergy Tests:    Atopy Screen (Placed 02/26/20 )    No Substance Readings (15 min) Evaluation   POS Histamine 1mg/ml ++    NEG NaCl 0.9% -      No Substance Readings (15 min) Evaluation   1 Alternaria alternata (tenuis)  -    2 Cladosporium herbarum -    3 Aspergillus fumigatus -    4 Penicillium notatum -    5 Dermatophagoides pteronyssinus -    6 Dermatophagoides farinae -    7 Dog epithelium (canis spp) -    8 Cat hair (isa catus) -    9 Cockroach   (Blatella americana & germanica) -    10 Grass mix midwest   (Sushila, Orchard, Redtop, Toney) ++    11 Miguel grass (sorghum halepense) (+)    12 [place] mix   (common Cocklebur, Lamb s quarters, rough redroot Pigweed, Dock/Sorrel) -    13 Mug wort (artemisia vulgare) -    14 Ragweed giant/short (ambrosia spp) +    15 English Plantain (plantago lanceolata) +    16 Tree mix 1 (Pecan, Maple BHR, Oak RVW, american [place], black [place]) ++    17 Red cedar (juniperus [place]) -    18 Tree mix 2   (white Nic, river/red Birch, black [place], common [place], american Elm) +++    19 Box elder/Maple mix (acer spp) ++    20 [place] shagbark (carya ovata) ++       -      Conclusion: mostly tree and grass pollen sensitization, where the tree pollen allergy is probably clinically relevant. Less the grass  pollens      Intradermal Testing (Placed 02/26/20 )    No Substance Conc.  Readings (15min) Evaluation   1 NaCl  0.9% -    2 Histamine (prick) 0.1mg / ml ++    4 Standard Dust Mite - D. Farinae 1:10 ++ 10mmP/25mmE   5 Standard Dust Mite - D. Pteronyssinus 1:10 + 5mmP/8mmE   10 Aspergillus fumigatus  1:10 -    11 Penicillium notatum 1:10 -      Conclusion: strong sensitization to D. Farinae, less to D. Pteronyssinus and no immediate reaction to molds      Impression/Plan:    >> perennial Rhinitis and postnasal drip mostly night/morning    In intradermal tests immediate type sensitization to house dust mites (not molds)  --> reduce house dust mites = info given  --> try Flonase nasal spray morning and evening for 1 month and maybe in the evening Claritine 10mg  ==> discuss in 4-6 weeks immunotherapy    >> slight seasonal Rhinoconjunctivitis in spring with sensitization to tree pollens    Probably clinically less relevant to grass pollens      Follow-up in 4-6 weeks    I spent a total of 24minutes face to face with José Luis Palacios during today s office visit. Over 50% of this time was spent counseling the patient and/or coordinating care.  Please see Assessment and Plan for details.  This excludes any time spent performing prick and intradermal tests

## 2020-02-26 NOTE — PROGRESS NOTES
[facility] Allergy Note      Allergy Problem List:    Specialty Problems        Allergy Diagnoses    Acne vulgaris              CC:   Allergy Consult (José Luis is here today for an Allergy Consult. Been two years of thick mucus in the back of the throat. Happens a lot at night. Stated there's no pain just uncomfortable. Thought it was the house he was living in and then moved to a nicer and new home but still is present.)        Encounter Date: Feb 26, 2020    History of Present Illness:  Mr. José Luis Palacios is a 24 year old male who presents as a referral from Self.    Since about 2 years mostly night and morning nasal inflammation, postnasal drip, less congestion, no conjunctivitis and no coughing/asthma    Patient has as well in April/Mai since 5-6 itchy eyes and some Rhinitis.    Patient was using 2-3 weeks Flonase = no change  Antihistamines not really taken and if taken no big difference and Ranitidine did not change as well     Patient has Halo Nevi    Past Medical History:   Patient Active Problem List   Diagnosis     CARDIOVASCULAR SCREENING; LDL GOAL LESS THAN 160     Acne vulgaris     White coat syndrome without hypertension     Past Medical History:   Diagnosis Date     Acne        Allergy History:   No Known Allergies    Sister and mother has some reactions to cat and dogs. Patient not (grew up with pets)    Social History:  Patient is nurse in pediatric cardiovascular ICU     reports that he has never smoked. He has never used smokeless tobacco. He reports current alcohol use. He reports that he does not use drugs.      Family History:  Family History   Problem Relation Age of Onset     Hypertension Father      Breast Cancer Paternal Grandmother      Coronary Artery Disease Paternal Grandfather         heart attack     Skin Cancer Mother      Suicide Other        Medications:  Current Outpatient Medications   Medication Sig Dispense Refill     tretinoin (RETIN-A) 0.05 %  cream Spread a pea size amount into affected area topically at bedtime.  Use sunscreen SPF>20. 45 g 11     fluticasone (FLONASE) 50 MCG/ACT nasal spray Spray 1 spray into both nostrils daily (Patient not taking: Reported on 2/26/2020) 16 g 1           Review of Systems:  -As per HPI  -Constitutional: The patient denies fatigue, fevers, chills, unintended weight loss, and night sweats.  -HEENT: Patient denies nonhealing oral sores.  -Skin: As above in HPI. No additional skin concerns.    Physical exam:  Vitals: There were no vitals taken for this visit.  Not relevant    -No other lesions of concern on areas examined.     Allergy Tests:    Atopy Screen (Placed 02/26/20 )    No Substance Readings (15 min) Evaluation   POS Histamine 1mg/ml ++    NEG NaCl 0.9% -      No Substance Readings (15 min) Evaluation   1 Alternaria alternata (tenuis)  -    2 Cladosporium herbarum -    3 Aspergillus fumigatus -    4 Penicillium notatum -    5 Dermatophagoides pteronyssinus -    6 Dermatophagoides farinae -    7 Dog epithelium (canis spp) -    8 Cat hair (isa catus) -    9 Cockroach   (Blatella americana & germanica) -    10 Grass mix midwest   (Sushila, Orchard, Redtop, Toney) ++    11 Miguel grass (sorghum halepense) (+)    12 [place] mix   (common Cocklebur, Lamb s quarters, rough redroot Pigweed, Dock/Sorrel) -    13 Mug wort (artemisia vulgare) -    14 Ragweed giant/short (ambrosia spp) +    15 English Plantain (plantago lanceolata) +    16 Tree mix 1 (Pecan, Maple BHR, Oak RVW, american [place], black [place]) ++    17 Red cedar (juniperus virgin[place]) -    18 Tree mix 2   (white Nic, river/red Birch, black [place], common [place], american Elm) +++    19 Box elder/Maple mix (acer spp) ++    20 Hickory shagbark (carya ovata) ++       -      Conclusion: mostly tree and grass pollen sensitization, where the tree pollen allergy is probably clinically relevant. Less the grass pollens    Intradermal Testing (Placed 02/26/20 )     No  Substance Conc.  Readings (15min) Evaluation   1 NaCl  0.9% -     2 Histamine (prick) 0.1mg / ml ++     4 Standard Dust Mite - D. Farinae 1:10 ++ 10mmP/25mmE   5 Standard Dust Mite - D. Pteronyssinus 1:10 + 5mmP/8mmE   10 Aspergillus fumigatus  1:10 -     11 Penicillium notatum 1:10 -        Conclusion: strong sensitization to D. Farinae, less to D. Pteronyssinus and no immediate reaction to molds       Impression/Plan:    >> perennial Rhinitis and postnasal drip mostly night/morning    In intradermal tests immediate type sensitization to house dust mites (not molds)  --> reduce house dust mites = info given  --> try Flonase nasal spray morning and evening for 1 month and maybe in the evening Claritine 10mg  ==> discuss in 4-6 weeks immunotherapy    >> slight seasonal Rhinoconjunctivitis in spring with sensitization to tree pollens    Probably clinically less relevant to grass pollens      Follow-up in 4-6 weeks    I spent a total of 24minutes face to face with José Luis Palacios during today s office visit. Over 50% of this time was spent counseling the patient and/or coordinating care.  Please see Assessment and Plan for details.  This excludes any time spent performing prick and intradermal tests

## 2020-02-26 NOTE — LETTER
2/26/2020         RE: José Luis Palacios  [address] [facility] [address]        Dear Colleague,    Thank you for referring your patient, José Luis Palacios, to the [facility] ALLERGY. Please see a copy of my visit note below.    [facility] Allergy Note      Allergy Problem List:    Specialty Problems        Allergy Diagnoses    Acne vulgaris              CC:   Allergy Consult (José Luis is here today for an Allergy Consult. Been two years of thick mucus in the back of the throat. Happens a lot at night. Stated there's no pain just uncomfortable. Thought it was the house he was living in and then moved to a nicer and new home but still is present.)        Encounter Date: Feb 26, 2020    History of Present Illness:  Mr. José Luis Palacios is a 24 year old male who presents as a referral from Self.    Since about 2 years mostly night and morning nasal inflammation, postnasal drip, less congestion, no conjunctivitis and no coughing/asthma    Patient has as well in April/Mai since 5-6 itchy eyes and some Rhinitis.    Patient was using 2-3 weeks Flonase = no change  Antihistamines not really taken and if taken no big difference and Ranitidine did not change as well     Patient has Halo Nevi    Past Medical History:   Patient Active Problem List   Diagnosis     CARDIOVASCULAR SCREENING; LDL GOAL LESS THAN 160     Acne vulgaris     White coat syndrome without hypertension     Past Medical History:   Diagnosis Date     Acne        Allergy History:   No Known Allergies    Sister and mother has some reactions to cat and dogs. Patient not (grew up with pets)    Social History:  Patient is nurse in pediatric cardiovascular ICU     reports that he has never smoked. He has never used smokeless tobacco. He reports current alcohol use. He reports that he does not use drugs.      Family History:  Family History   Problem Relation Age of Onset     Hypertension Father      Breast Cancer  Paternal Grandmother      Coronary Artery Disease Paternal Grandfather         heart attack     Skin Cancer Mother      Suicide Other        Medications:  Current Outpatient Medications   Medication Sig Dispense Refill     tretinoin (RETIN-A) 0.05 % cream Spread a pea size amount into affected area topically at bedtime.  Use sunscreen SPF>20. 45 g 11     fluticasone (FLONASE) 50 MCG/ACT nasal spray Spray 1 spray into both nostrils daily (Patient not taking: Reported on 2/26/2020) 16 g 1           Review of Systems:  -As per HPI  -Constitutional: The patient denies fatigue, fevers, chills, unintended weight loss, and night sweats.  -HEENT: Patient denies nonhealing oral sores.  -Skin: As above in HPI. No additional skin concerns.    Physical exam:  Vitals: There were no vitals taken for this visit.  Not relevant    -No other lesions of concern on areas examined.     Allergy Tests:    Atopy Screen (Placed 02/26/20 )    No Substance Readings (15 min) Evaluation   POS Histamine 1mg/ml ++    NEG NaCl 0.9% -      No Substance Readings (15 min) Evaluation   1 Alternaria alternata (tenuis)  -    2 Cladosporium herbarum -    3 Aspergillus fumigatus -    4 Penicillium notatum -    5 Dermatophagoides pteronyssinus -    6 Dermatophagoides farinae -    7 Dog epithelium (canis spp) -    8 Cat hair (isa catus) -    9 Cockroach   (Blatella americana & germanica) -    10 Grass mix midwest   (Sushila, Orchard, Redtop, Toney) ++    11 Miguel grass (sorghum halepense) (+)    12 [place] mix   (common Cocklebur, Lamb s quarters, rough redroot Pigweed, Dock/Sorrel) -    13 Mug wort (artemisia vulgare) -    14 Ragweed giant/short (ambrosia spp) +    15 English Plantain (plantago lanceolata) +    16 Tree mix 1 (Pecan, Maple BHR, Oak RVW, american [place], black [place]) ++    17 Red cedar (juniperus virgin[place]) -    18 Tree mix 2   (white Nic, river/red Birch, black [place], common [place], american Elm) +++    19 Box elder/Maple mix (acer  spp) ++    20 [place] shagbark (carya ovata) ++       -      Conclusion: mostly tree and grass pollen sensitization, where the tree pollen allergy is probably clinically relevant. Less the grass pollens    Intradermal Testing (Placed 02/26/20 )     No Substance Conc.  Readings (15min) Evaluation   1 NaCl  0.9% -     2 Histamine (prick) 0.1mg / ml ++     4 Standard Dust Mite - D. Farinae 1:10 ++ 10mmP/25mmE   5 Standard Dust Mite - D. Pteronyssinus 1:10 + 5mmP/8mmE   10 Aspergillus fumigatus  1:10 -     11 Penicillium notatum 1:10 -        Conclusion: strong sensitization to D. Farinae, less to D. Pteronyssinus and no immediate reaction to molds       Impression/Plan:    >> perennial Rhinitis and postnasal drip mostly night/morning    In intradermal tests immediate type sensitization to house dust mites (not molds)  --> reduce house dust mites = info given  --> try Flonase nasal spray morning and evening for 1 month and maybe in the evening Claritine 10mg  ==> discuss in 4-6 weeks immunotherapy    >> slight seasonal Rhinoconjunctivitis in spring with sensitization to tree pollens    Probably clinically less relevant to grass pollens      Follow-up in 4-6 weeks    I spent a total of 24minutes face to face with José Luis Palacios during today s office visit. Over 50% of this time was spent counseling the patient and/or coordinating care.  Please see Assessment and Plan for details.  This excludes any time spent performing prick and intradermal tests    Patient had 22 prick tests placed this visit.    Control Tests (2 tests)    Standard Atopic Panel (20 tests)      Atopy Screen (Placed 02/26/20 )    No Substance Readings (15 min) Evaluation   POS Histamine 1mg/ml -    NEG NaCl 0.9% -      No Substance Readings (15 min) Evaluation   1 Alternaria alternata (tenuis)  -    2 Cladosporium herbarum -    3 Aspergillus fumigatus -    4 Penicillium notatum -    5 Dermatophagoides pteronyssinus -    6 Dermatophagoides  farinae -    7 Dog epithelium (canis spp) -    8 Cat hair (isa catus) -    9 Cockroach   (Blatella americana & germanica) -    10 Grass mix midwest   (Sushila, Orchard, Redtop, Toney) -    11 Miguel grass (sorghum halepense) -    12 [place] mix   (common Cocklebur, Lamb s quarters, rough redroot Pigweed, Dock/Sorrel) -    13 Mug wort (artemisia vulgare) -    14 Ragweed giant/short (ambrosia spp) -    15 English Plantain (plantago lanceolata) -    16 Tree mix 1 (Pecan, Maple BHR, Oak RVW, american [place], black [place]) -    17 Red cedar (juniperus virgin[place]) -    18 Tree mix 2   (white Nic, river/red Birch, black [place], common [place], american Elm) -    19 Box elder/Maple mix (acer spp) -    20 [place] shagbark (carya ovata) -       -      Conclusion:                  Again, thank you for allowing me to participate in the care of your patient.        Sincerely,        Mainsh Vides MD

## 2020-02-26 NOTE — PROGRESS NOTES
Patient had 22 prick tests placed this visit.    Control Tests (2 tests)    Standard Atopic Panel (20 tests)      Atopy Screen (Placed 02/26/20 )    No Substance Readings (15 min) Evaluation   POS Histamine 1mg/ml -    NEG NaCl 0.9% -      No Substance Readings (15 min) Evaluation   1 Alternaria alternata (tenuis)  -    2 Cladosporium herbarum -    3 Aspergillus fumigatus -    4 Penicillium notatum -    5 Dermatophagoides pteronyssinus -    6 Dermatophagoides farinae -    7 Dog epithelium (canis spp) -    8 Cat hair (isa catus) -    9 Cockroach   (Blatella americana & germanica) -    10 Grass mix midwest   (Sushila, Orchard, Redtop, Toney) -    11 Miguel grass (sorghum halepense) -    12 Weed mix   (common Cocklebur, Lamb s quarters, rough redroot Pigweed, Dock/Sorrel) -    13 Mug wort (artemisia vulgare) -    14 Ragweed giant/short (ambrosia spp) -    15 English Plantain (plantago lanceolata) -    16 Tree mix 1 (Pecan, Maple BHR, Oak RVW, american [place], black [place]) -    17 Red cedar (juniperus [place]) -    18 Tree mix 2   (white Nic, river/red Birch, black [place], common [place], american Elm) -    19 Box elder/Maple mix (acer spp) -    20 [place] shagbark (carya ovata) -       -      Conclusion:

## 2020-05-26 ENCOUNTER — VIRTUAL VISIT (OUTPATIENT)
Dept: ALLERGY | Facility: CLINIC | Age: 25
End: 2020-05-26
Payer: COMMERCIAL

## 2020-05-26 DIAGNOSIS — J30.9 ALLERGIC RHINITIS WITH POSTNASAL DRIP: ICD-10-CM

## 2020-05-26 DIAGNOSIS — J30.9 ALLERGIC RHINOCONJUNCTIVITIS: Primary | ICD-10-CM

## 2020-05-26 DIAGNOSIS — H10.10 ALLERGIC RHINOCONJUNCTIVITIS: Primary | ICD-10-CM

## 2020-05-26 DIAGNOSIS — R09.82 ALLERGIC RHINITIS WITH POSTNASAL DRIP: ICD-10-CM

## 2020-05-26 RX ORDER — PREDNISONE 50 MG/1
TABLET ORAL
Qty: 2 TABLET | Refills: 3 | Status: SHIPPED | OUTPATIENT
Start: 2020-05-26 | End: 2020-09-07

## 2020-05-26 RX ORDER — FEXOFENADINE HCL 180 MG/1
180 TABLET ORAL DAILY
Qty: 30 TABLET | Refills: 3 | Status: SHIPPED | OUTPATIENT
Start: 2020-05-26 | End: 2020-09-07

## 2020-05-26 NOTE — PROGRESS NOTES
Note for return visit for Dermato-Allergy       Encounter Date: May 26, 2020    History of Present Illness:  I have reviewed the teledermatology  information and the nursing intake corresponding to this issue. José Luis Palacios is a 25 year old male who presents as a teledermatology consult for the following information take directly from the prior notes and video call performed by myself:   Patient developed about 1 month ago bad conjunctivitis and less runny nose. However, patient is using Flonase and eye drops (vasoconstrictor). Took every morning Cetirizine and th    However, patient still has in the morning and night from October to April very disturbing postnasal drip and recurrent pharyngitis with tonsil swelling. With Flonase somehow less problems, but even with HDM reduction still problems and patient still pretty annoyed by symptoms.   In addition, symptoms in early spring from the early blooming trees (see the prick test results!). With pollens more conjunctivitis.  Less problems in early summer and summer, which indicates that the pollen problem is really more due to early blooming trees.    Discussed with patient options for Immunotherapy and his mother seems to have had 8 years of IT with decent success.  Patient would be interested to start IT.    Additional comments and observations from review of the patient s chart including the following:    See above    ROS: Patient generally feeling well today   Physical Examination:  General: Well-appearing, appropriately-developed individual.  Skin: not relevant  Nose: postnasal drip, not major congestion  Eyes: recurrent conjunctivitis in spring  Asthma: none reported    CC:   Allergy Testing Followup (Giles is here fro an allergy follow up. He states that he has had minimal improvement. )      Previous History of Present Illness:  Mr. José Luis Palacios is a 25 year old male who presents as a referral from Self.    Since about 2 years mostly night  and morning nasal inflammation, postnasal drip, less congestion, no conjunctivitis and no coughing/asthma    Patient has as well in April/Mai since 5-6 itchy eyes and some Rhinitis.    Patient was using 2-3 weeks Flonase = no change  Antihistamines not really taken and if taken no big difference and Ranitidine did not change as well     Patient has Halo Nevi    Past Medical History:   Patient Active Problem List   Diagnosis     CARDIOVASCULAR SCREENING; LDL GOAL LESS THAN 160     Acne vulgaris     White coat syndrome without hypertension     Past Medical History:   Diagnosis Date     Acne        Allergy History:   No Known Allergies    Sister and mother has some reactions to cat and dogs. Patient not (grew up with pets)    Social History:  Patient is nurse in pediatric cardiovascular ICU     reports that he has never smoked. He has never used smokeless tobacco. He reports current alcohol use. He reports that he does not use drugs.      Family History:  Family History   Problem Relation Age of Onset     Hypertension Father      Breast Cancer Paternal Grandmother      Coronary Artery Disease Paternal Grandfather         heart attack     Skin Cancer Mother      Suicide Other        Medications:  Current Outpatient Medications   Medication Sig Dispense Refill     fluticasone (FLONASE) 50 MCG/ACT nasal spray Spray 1 spray into both nostrils daily (Patient not taking: Reported on 2/26/2020) 16 g 1     tretinoin (RETIN-A) 0.05 % cream Spread a pea size amount into affected area topically at bedtime.  Use sunscreen SPF>20. 45 g 11       Allergy Tests:    Atopy Screen (Placed 02/26/20 )    No Substance Readings (15 min) Evaluation   POS Histamine 1mg/ml ++    NEG NaCl 0.9% -      No Substance Readings (15 min) Evaluation   1 Alternaria alternata (tenuis)  -    2 Cladosporium herbarum -    3 Aspergillus fumigatus -    4 Penicillium notatum -    5 Dermatophagoides pteronyssinus -    6 Dermatophagoides farinae -    7 Dog  epithelium (canis spp) -    8 Cat hair (isa catus) -    9 Cockroach   (Blatella americana & germanica) -    10 Grass mix midwest   (Sushila, Orchard, Redtop, Toney) ++    11 Miguel grass (sorghum halepense) (+)    12 [place] mix   (common Cocklebur, Lamb s quarters, rough redroot Pigweed, Dock/Sorrel) -    13 Mug wort (artemisia vulgare) -    14 Ragweed giant/short (ambrosia spp) +    15 English Plantain (plantago lanceolata) +    16 Tree mix 1 (Pecan, Maple BHR, Oak RVW, american [place], black [place]) ++    17 Red cedar (juniperus virginia) -    18 Tree mix 2   (white Nic, river/red Birch, black [place], common [place], american Elm) +++    19 Box elder/Maple mix (acer spp) ++    20 Hickory shagbark (carya ovata) ++       -      Conclusion: mostly tree and grass pollen sensitization, where the tree pollen allergy is probably clinically relevant. Less the grass pollens    Intradermal Testing (Placed 02/26/20 )     No Substance Conc.  Readings (15min) Evaluation   1 NaCl  0.9% -     2 Histamine (prick) 0.1mg / ml ++     4 Standard Dust Mite - D. Farinae 1:10 ++ 10mmP/25mmE   5 Standard Dust Mite - D. Pteronyssinus 1:10 + 5mmP/8mmE   10 Aspergillus fumigatus  1:10 -     11 Penicillium notatum 1:10 -        Conclusion: strong sensitization to D. Farinae, less to D. Pteronyssinus and no immediate reaction to molds       Impression/Plan:    >> perennial Rhinitis and postnasal drip mostly night/morning    In intradermal tests immediate type sensitization to house dust mites (not molds)  --> reduce house dust mites = info given  --> try Flonase nasal spray morning and evening for 1 month and maybe in the evening Claritine 10mg  ==> go fore immunotherapy    >> slight seasonal Rhinoconjunctivitis in spring with sensitization to tree pollens    Probably clinically less relevant to grass pollens    ==> order Immunotherapy for following extracts:  - house dust mites ALK standardized 50% D. F. And 50% D.p.STMM 10,000 AU right  arm  - tree pollen mix left arm center Al 9 tree mix ([place], White Nic, Black Birch, American Elm, Shagbark, Hickory, Maple (Sugar), White Oak, White [place], American [place])    Patient counseling:  About IT, possible side effects (Urticaria, Anaphylaxis, Asthma, local reactions) and that patient has to wait after each shot 30min and that first 4-5 months weekly and then monthly. Explained as well emergency set (without the Epipen!) and that it should be with patient the day of IT (given handout in wrap up).        Follow-up: for weekly IT as soon as extracts arrive      Thank you for the opportunity be involved in the care of this patient.     Staff:  Iris Villegas LPN    _____________________________________________________________________________    Teledermatology information:  - Location of patient: Home  - Patient presented in referral from: self  - Location of teledermatologist:  ( [facility] (, [place], [place])  - Reason teledermatology is appropriate:  of National Emergency Regarding Coronavirus disease (COVID 19) Outbreak  - Method of transmission:  Store and Forward and Video ( Invitation sent by:  Amwell and text to cell phone: [phone] )  - Date of images: during video  - Service start time:8:44am  - Service end time: 9:21am  - Date of report: 05/26/20      I spent a total of 37 minutes for telemedicine consult with José Luis Palacios during today s video meeting. Over 50% of this time was spent counseling the patient and/or coordinating care. Please see Assessment and Plan for details. This included choosing and ordering the IT extracts, emergency set and explaining how IT works and possible side effects.

## 2020-05-26 NOTE — PROGRESS NOTES
"José Luis Palacios is a 25 year old male who is being evaluated via a billable video visit.      The patient has been notified of following:     \"This video visit will be conducted via a call between you and your physician/provider. We have found that certain health care needs can be provided without the need for an in-person physical exam.  This service lets us provide the care you need with a video conversation.  If a prescription is necessary we can send it directly to your pharmacy.  If lab work is needed we can place an order for that and you can then stop by our lab to have the test done at a later time.    Video visits are billed at different rates depending on your insurance coverage.  Please reach out to your insurance provider with any questions.    If during the course of the call the physician/provider feels a video visit is not appropriate, you will not be charged for this service.\"    Patient has given verbal consent for Video visit? Yes    How would you like to obtain your AVS? [facility]    Patient would like the video invitation sent by: Text to cell phone: [phone]    Will anyone else be joining your video visit? No        Video-Visit Details    Type of service:  Video Visit    Originating Location (pt. Location): Home    Distant Location (provider location):  [facility]     Platform used for Video Visit: Oc Villegas LPN        "

## 2020-05-26 NOTE — PATIENT INSTRUCTIONS
[facility] Teledermatology Visit    Thank you for allowing us to participate in your care. Your findings, instructions and follow-up plan are as follows:  Impression/Plan:    >> perennial Rhinitis and postnasal drip mostly night/morning    In intradermal tests immediate type sensitization to house dust mites (not molds)  --> reduce house dust mites = info given  --> try Flonase nasal spray morning and evening for 1 month and maybe in the evening Claritine 10mg  ==> go fore immunotherapy    >> slight seasonal Rhinoconjunctivitis in spring with sensitization to tree pollens    Probably clinically less relevant to grass pollens    ==> order Immunotherapy for following extracts:  - house dust mites ALK standardized 50% D. F. And 50% D.p.STMM 10,000 AU right arm  - tree pollen mix left arm center Al 9 tree mix ([place], White Nic, Black Birch, American Elm, Shagbark, Hickory, Maple (Sugar), White Oak, White [place], American [place])    Patient counseling:  About IT, possible side effects (Urticaria, Anaphylaxis, Asthma, local reactions) and that patient has to wait after each shot 30min and that first 4-5 months weekly and then monthly. Explained as well emergency set (without the Epipen!) and that it should be with patient the day of IT.    When should I call my doctor?    If you are worsening or not improving, please, contact us or seek urgent care as noted below.     Who should I call with questions (adults)?    [facility] (adult and pediatric): [phone]     [facility] (adult): [phone]    For urgent needs outside of business hours call the [facility] at [phone] and ask for the dermatology resident on call    If this is a medical emergency and you are unable to reach an ER, Call [phone]      Who should I call with questions (pediatric)?  [facility]- Pediatric Dermatology  Dr. Lilia Bagley,   Ray Back, Dr. Diandra Shepard, VISHNU Marte [address] Dr. Cayla Cruz & Dr. Capo Pelayo  Non Urgent  Nurse Triage Line; [phone]- Angela and Reyna LYLE Care Coordinatorrobi Tolbert (/Complex ) [phone]    If you need a prescription refill, please contact your pharmacy. Refills are approved or denied by our Physicians during normal business hours, Monday through Fridays  Per office policy, refills will not be granted if you have not been seen within the past year (or sooner depending on your child's condition)    Scheduling Information:  Pediatric Appointment Scheduling and Call Center [phone]  Radiology Scheduling- [phone]  Sedation Unit Scheduling- [phone]  [place] Scheduling- [facility] [phone]; Pediatric Dermatology [phone]  Main  Services: [phone]  French: [phone]  Bahraini: [phone]  Hmong/Peng/Syriac: [phone]  Preadmission Nursing Department Fax Number: [phone] (Fax all pre-operative paperwork to this number)    For urgent matters arising during evenings, weekends, or holidays that cannot wait for normal business hours please call [phone] and ask for the Dermatology Resident On-Call to be paged.    Emergency treatment for patients with severe immediate allergic reactions to drugs, food or insect bites:      The clinical appearance of severe immediate type allergic reactions can range from wheals and hives all over the body (urticaria), to swellings in the face (eyes, lips) to sometimes swellings in the tongue or airways with problems to swallow or breathe. These reactions can end up in cardiovascular reactions with collapse and shock.    1. Stay calm, avoid running around, and alert other people to help you    2. Immediately take your emergency medication.     For adults (over 16 years old): 2 tablet of antihistamines (e.g. cetirizine 10 mg or fexofenadine 180 mg) and 100 mg  prednisone.     For children (less than 16 years old): 1 tablet of antihistamine (e.g. cetirizine 10 mg or fexofenadine 180 mg) and 50 mg prednisone    Swallow however you can, with or without water. This treatment is usually sufficient for treatment of uncomplicated hives and swellings.       Emergency set in key chain box containing 2 tablets prednisone 50 mg and 2 tablets cetirizine 10 mg.    3. In case of acute swelling of tongue, trouble swallowing, blocking of the airways, breathing problems, and/or signs of imminent collapse of shock (sweating, weakness, dizziness, paleness), use the adrenalin auto-injector (Epipen   for adults and Epipen   lewis for children). Make an injection into the outer thighs, if necessary through clothing.    4. Seek immediate emergency medical treatment after use.        Manish Vides, [facility], [place], [facility]; 03-

## 2020-05-26 NOTE — NURSING NOTE
Allergy Rooming Note    José Luis Palacios's goals for this visit include:   Chief Complaint   Patient presents with     Allergy Testing Followup     Giles is here fro an allergy follow up. He states that he has had minimal improvement.      Iris Villegas LPN

## 2020-05-26 NOTE — LETTER
"    5/26/2020         RE: José Luis Palacios  [address] [facility] [address]        Dear Colleague,    Thank you for referring your patient, José Luis Palacios, to the [facility]. Please see a copy of my visit note below.    José Luis Palacios is a 25 year old male who is being evaluated via a billable video visit.      The patient has been notified of following:     \"This video visit will be conducted via a call between you and your physician/provider. We have found that certain health care needs can be provided without the need for an in-person physical exam.  This service lets us provide the care you need with a video conversation.  If a prescription is necessary we can send it directly to your pharmacy.  If lab work is needed we can place an order for that and you can then stop by our lab to have the test done at a later time.    Video visits are billed at different rates depending on your insurance coverage.  Please reach out to your insurance provider with any questions.    If during the course of the call the physician/provider feels a video visit is not appropriate, you will not be charged for this service.\"    Patient has given verbal consent for Video visit? Yes    How would you like to obtain your AVS? [facility]    Patient would like the video invitation sent by: Text to cell phone: [phone]    Will anyone else be joining your video visit? No        Video-Visit Details    Type of service:  Video Visit    Originating Location (pt. Location): Home    Distant Location (provider location):  [facility]     Platform used for Video Visit: Oc Villegas LPN          Note for return visit for Dermato-Allergy       Encounter Date: May 26, 2020    History of Present Illness:  I have reviewed the teledermatology  information and the nursing intake corresponding to this issue. José Luis Palacios is a 25 year old male who presents as a teledermatology consult " for the following information take directly from the prior notes and video call performed by myself:   Patient developed about 1 month ago bad conjunctivitis and less runny nose. However, patient is using Flonase and eye drops (vasoconstrictor). Took every morning Cetirizine and th    However, patient still has in the morning and night from October to April very disturbing postnasal drip and recurrent pharyngitis with tonsil swelling. With Flonase somehow less problems, but even with HDM reduction still problems and patient still pretty annoyed by symptoms.   In addition, symptoms in early spring from the early blooming trees (see the prick test results!). With pollens more conjunctivitis.  Less problems in early summer and summer, which indicates that the pollen problem is really more due to early blooming trees.    Discussed with patient options for Immunotherapy and his mother seems to have had 8 years of IT with decent success.  Patient would be interested to start IT.    Additional comments and observations from review of the patient s chart including the following:    See above    ROS: Patient generally feeling well today   Physical Examination:  General: Well-appearing, appropriately-developed individual.  Skin: not relevant  Nose: postnasal drip, not major congestion  Eyes: recurrent conjunctivitis in spring  Asthma: none reported    CC:   Allergy Testing Followup (Giles is here fro an allergy follow up. He states that he has had minimal improvement. )      Previous History of Present Illness:  Mr. José Luis Palacios is a 25 year old male who presents as a referral from Self.    Since about 2 years mostly night and morning nasal inflammation, postnasal drip, less congestion, no conjunctivitis and no coughing/asthma    Patient has as well in April/Mai since 5-6 itchy eyes and some Rhinitis.    Patient was using 2-3 weeks Flonase = no change  Antihistamines not really taken and if taken no big difference  and Ranitidine did not change as well     Patient has Halo Nevi    Past Medical History:   Patient Active Problem List   Diagnosis     CARDIOVASCULAR SCREENING; LDL GOAL LESS THAN 160     Acne vulgaris     White coat syndrome without hypertension     Past Medical History:   Diagnosis Date     Acne        Allergy History:   No Known Allergies    Sister and mother has some reactions to cat and dogs. Patient not (grew up with pets)    Social History:  Patient is nurse in pediatric cardiovascular ICU     reports that he has never smoked. He has never used smokeless tobacco. He reports current alcohol use. He reports that he does not use drugs.      Family History:  Family History   Problem Relation Age of Onset     Hypertension Father      Breast Cancer Paternal Grandmother      Coronary Artery Disease Paternal Grandfather         heart attack     Skin Cancer Mother      Suicide Other        Medications:  Current Outpatient Medications   Medication Sig Dispense Refill     fluticasone (FLONASE) 50 MCG/ACT nasal spray Spray 1 spray into both nostrils daily (Patient not taking: Reported on 2/26/2020) 16 g 1     tretinoin (RETIN-A) 0.05 % cream Spread a pea size amount into affected area topically at bedtime.  Use sunscreen SPF>20. 45 g 11       Allergy Tests:    Atopy Screen (Placed 02/26/20 )    No Substance Readings (15 min) Evaluation   POS Histamine 1mg/ml ++    NEG NaCl 0.9% -      No Substance Readings (15 min) Evaluation   1 Alternaria alternata (tenuis)  -    2 Cladosporium herbarum -    3 Aspergillus fumigatus -    4 Penicillium notatum -    5 Dermatophagoides pteronyssinus -    6 Dermatophagoides farinae -    7 Dog epithelium (canis spp) -    8 Cat hair (isa catus) -    9 Cockroach   (Blatella americana & germanica) -    10 Grass mix midwest   (Sushila, Orchard, Redtop, Toney) ++    11 Miguel grass (sorghum halepense) (+)    12 [place] mix   (common Cocklebur, Lamb s quarters, rough redroot Pigweed, Dock/[place]) -     13 Mug wort (artemisia vulgare) -    14 Ragweed giant/short (ambrosia spp) +    15 English Plantain (plantago lanceolata) +    16 Tree mix 1 (Pecan, Maple BHR, Oak RVW, american [place], black [place]) ++    17 Red cedar (juniperus virginia) -    18 Tree mix 2   (white Nic, river/red Birch, black [place], common [place], american Elm) +++    19 Box elder/Maple mix (acer spp) ++    20 Hickory shagbark (carya ovata) ++       -      Conclusion: mostly tree and grass pollen sensitization, where the tree pollen allergy is probably clinically relevant. Less the grass pollens    Intradermal Testing (Placed 02/26/20 )     No Substance Conc.  Readings (15min) Evaluation   1 NaCl  0.9% -     2 Histamine (prick) 0.1mg / ml ++     4 Standard Dust Mite - D. Farinae 1:10 ++ 10mmP/25mmE   5 Standard Dust Mite - D. Pteronyssinus 1:10 + 5mmP/8mmE   10 Aspergillus fumigatus  1:10 -     11 Penicillium notatum 1:10 -        Conclusion: strong sensitization to D. Farinae, less to D. Pteronyssinus and no immediate reaction to molds       Impression/Plan:    >> perennial Rhinitis and postnasal drip mostly night/morning    In intradermal tests immediate type sensitization to house dust mites (not molds)  --> reduce house dust mites = info given  --> try Flonase nasal spray morning and evening for 1 month and maybe in the evening Claritine 10mg  ==> go fore immunotherapy    >> slight seasonal Rhinoconjunctivitis in spring with sensitization to tree pollens    Probably clinically less relevant to grass pollens    ==> order Immunotherapy for following extracts:  - house dust mites ALK standardized 50% D. F. And 50% D.p.STMM 10,000 AU right arm  - tree pollen mix left arm center Al 9 tree mix ([employer], White Nic, Black Birch, American Elm, Shagbark, Hickory, Maple (Sugar), White Oak, White [place], American [place])    Patient counseling:  About IT, possible side effects (Urticaria, Anaphylaxis, Asthma, local reactions) and that patient has  to wait after each shot 30min and that first 4-5 months weekly and then monthly. Explained as well emergency set (without the Epipen!) and that it should be with patient the day of IT (given handout in wrap up).        Follow-up: for weekly IT as soon as extracts arrive      Thank you for the opportunity be involved in the care of this patient.     Staff:  Iris Villegas LPN    _____________________________________________________________________________    Teledermatology information:  - Location of patient: Home  - Patient presented in referral from: self  - Location of teledermatologist:  ([facility] (, [place], [place])  - Reason teledermatology is appropriate:  of National Emergency Regarding Coronavirus disease (COVID 19) Outbreak  - Method of transmission:  Store and Forward and Video ( Invitation sent by:  AmAbe[employer] and text to cell phone: [phone] )  - Date of images: during video  - Service start time:8:44am  - Service end time: 9:21am  - Date of report: 05/26/20      I spent a total of 37 minutes for telemedicine consult with José Luis Palacios during today s video meeting. Over 50% of this time was spent counseling the patient and/or coordinating care. Please see Assessment and Plan for details. This included choosing and ordering the IT extracts, emergency set and explaining how IT works and possible side effects.    Again, thank you for allowing me to participate in the care of your patient.        Sincerely,        Manish Vides MD

## 2020-07-29 ENCOUNTER — TELEPHONE (OUTPATIENT)
Dept: DERMATOLOGY | Facility: CLINIC | Age: 25
End: 2020-07-29

## 2020-07-29 NOTE — TELEPHONE ENCOUNTER
[facility] Call Center    Phone Message    May a detailed message be left on voicemail: yes     Reason for Call: Other: Patient called in and stated he has not heard anything about getting his allergy injections. He woud like to know if the medication is in yet. Please follow up with the patient to discuss. Thank you.     Action Taken: Message routed to:  [facility] ([facility] allergy    Travel Screening: Not Applicable

## 2020-09-07 ENCOUNTER — MYC REFILL (OUTPATIENT)
Dept: ALLERGY | Facility: CLINIC | Age: 25
End: 2020-09-07

## 2020-09-07 DIAGNOSIS — H10.10 ALLERGIC RHINOCONJUNCTIVITIS: ICD-10-CM

## 2020-09-07 DIAGNOSIS — J30.9 ALLERGIC RHINITIS WITH POSTNASAL DRIP: ICD-10-CM

## 2020-09-07 DIAGNOSIS — J30.9 ALLERGIC RHINOCONJUNCTIVITIS: ICD-10-CM

## 2020-09-07 DIAGNOSIS — R09.82 ALLERGIC RHINITIS WITH POSTNASAL DRIP: ICD-10-CM

## 2020-09-09 ENCOUNTER — TELEPHONE (OUTPATIENT)
Dept: DERMATOLOGY | Facility: CLINIC | Age: 25
End: 2020-09-09

## 2020-09-09 RX ORDER — FEXOFENADINE HCL 180 MG/1
180 TABLET ORAL DAILY
Qty: 30 TABLET | Refills: 3 | Status: SHIPPED | OUTPATIENT
Start: 2020-09-09 | End: 2022-07-13

## 2020-09-09 RX ORDER — PREDNISONE 50 MG/1
TABLET ORAL
Qty: 2 TABLET | Refills: 3 | Status: SHIPPED | OUTPATIENT
Start: 2020-09-09 | End: 2024-04-15

## 2020-09-09 NOTE — TELEPHONE ENCOUNTER
[facility]    Phone Message    May a detailed message be left on voicemail: yes     Reason for Call: Medication Question or concern regarding medication   Prescription Clarification  Name of Medication: predniSONE (DELTASONE) 50 MG tablet   Prescribing Provider: [place]   Pharmacy: [facility]   What on the order needs clarification? The dosing instructions were not clear to the pharmacist - please call. Thanks.          Action Taken: Message routed to:  [facility]  allergy    Travel Screening: Not Applicable

## 2020-09-09 NOTE — TELEPHONE ENCOUNTER
[facility] Call Center    Phone Message    May a detailed message be left on voicemail: no     Reason for Call: Medication Refill Request    Has the patient contacted the pharmacy for the refill? Yes   Name of medication being requested: fexofenadine (ALLEGRA) 180 MG tablet  predniSONE (DELTASONE) 50 MG tablet   Provider who prescribed the medication:   Pharmacy: [facility]  Date medication is needed: Asap/Today, Pt wants these for his allergy injection scheduled tomorrow, he is supposed to take these as backups when he does injections. Please call Pt back for any issues.    Action Taken: Message routed to:  [facility]): [facility]    Travel Screening: Not Applicable

## 2020-09-10 ENCOUNTER — ALLIED HEALTH/NURSE VISIT (OUTPATIENT)
Dept: ALLERGY | Facility: CLINIC | Age: 25
End: 2020-09-10
Payer: COMMERCIAL

## 2020-09-10 DIAGNOSIS — Z51.6 NEED FOR DESENSITIZATION TO ALLERGENS: Primary | ICD-10-CM

## 2020-09-24 ENCOUNTER — OFFICE VISIT (OUTPATIENT)
Dept: OPHTHALMOLOGY | Facility: CLINIC | Age: 25
End: 2020-09-24
Payer: COMMERCIAL

## 2020-09-24 ENCOUNTER — ALLIED HEALTH/NURSE VISIT (OUTPATIENT)
Dept: ALLERGY | Facility: CLINIC | Age: 25
End: 2020-09-24
Payer: COMMERCIAL

## 2020-09-24 DIAGNOSIS — H52.13 MYOPIA OF BOTH EYES: Primary | ICD-10-CM

## 2020-09-24 DIAGNOSIS — Z51.6 NEED FOR DESENSITIZATION TO ALLERGENS: Primary | ICD-10-CM

## 2020-09-24 ASSESSMENT — KERATOMETRY
OS_AXISANGLE2_DEGREES: 019
METHOD_AUTO_MANUAL: AUTO-K
OD_AXISANGLE_DEGREES: 081
OS_K2POWER_DIOPTERS: 41.75
OS_AXISANGLE_DEGREES: 109
OS_K1POWER_DIOPTERS: 41.25
OD_AXISANGLE2_DEGREES: 171
OD_K1POWER_DIOPTERS: 41.00
OD_K2POWER_DIOPTERS: 41.50

## 2020-09-24 ASSESSMENT — REFRACTION
OD_SPHERE: PLANO
OS_SPHERE: -0.25

## 2020-09-24 ASSESSMENT — REFRACTION_CURRENTRX
OS_DIAMETER: 14.3
OS_BASECURVE: 8.5
OS_SPHERE: -1.00
OS_BRAND: ACUVUE OASYS 1 DAY
OD_BASECURVE: 8.5
OD_DIAMETER: 14.3
OD_BRAND: ACUVUE OASYS 1 DAY
OD_SPHERE: -1.00

## 2020-09-24 ASSESSMENT — VISUAL ACUITY
METHOD: SNELLEN - LINEAR
OD_CC: 20/20
OS_CC: 20/20
CORRECTION_TYPE: GLASSES

## 2020-09-24 ASSESSMENT — CUP TO DISC RATIO
OD_RATIO: 0.15
OS_RATIO: 0.15

## 2020-09-24 ASSESSMENT — TONOMETRY
OD_IOP_MMHG: 16
OS_IOP_MMHG: 17
IOP_METHOD: ICARE

## 2020-09-24 ASSESSMENT — REFRACTION_MANIFEST
OD_SPHERE: -0.50
OS_SPHERE: -0.50
OD_CYLINDER: SPHERE
OS_CYLINDER: SPHERE

## 2020-09-24 ASSESSMENT — REFRACTION_WEARINGRX
OD_CYLINDER: SPHERE
OS_SPHERE: -1.25
OS_CYLINDER: SPHERE
OD_SPHERE: -1.00

## 2020-09-24 ASSESSMENT — EXTERNAL EXAM - RIGHT EYE: OD_EXAM: NORMAL

## 2020-09-24 ASSESSMENT — SLIT LAMP EXAM - LIDS
COMMENTS: NORMAL
COMMENTS: NORMAL

## 2020-09-24 ASSESSMENT — CONF VISUAL FIELD
METHOD: COUNTING FINGERS
OS_NORMAL: 1
OD_NORMAL: 1

## 2020-09-24 ASSESSMENT — EXTERNAL EXAM - LEFT EYE: OS_EXAM: NORMAL

## 2020-09-24 NOTE — NURSING NOTE
Chief Complaints and History of Present Illnesses   Patient presents with     COMPREHENSIVE EYE EXAM     Chief Complaint(s) and History of Present Illness(es)     COMPREHENSIVE EYE EXAM     Laterality: both eyes    Associated symptoms: Negative for dryness, tearing, flashes, floaters and eye pain    Treatments tried: eye drops    Pain scale: 0/10              Comments     Patient notes he is here today for comprehensive exam with CTL evaluation, he feels that he is seeing well out of CTL and they are comfortable. Uses allergy gtts in early spring and late fall times, not using any currently.     Diane Clark COT September 24, 2020 9:13 AM

## 2020-09-24 NOTE — PROGRESS NOTES
A/P  1.) Myopia each eye  -Appears to be overminused in current Rx. He is totally asymptomatic and was doing well in previous Rx  -Damp MRx shows close to plano but subjectively does prefer some minus  -Reviewed findings with pt. Will cut minus to -0.50. Try out in CL's first. If these work well he can update glasses  -If symptomatic with decreased minus he can MyChart us and we can increase a bit more  -Dilated ocular health unremarkable each eye    Monitor 1-2 years routine, sooner prn    I have confirmed the patient's CC, HPI and reviewed Past Medical History, Past Surgical History, Social History, Family History, Problem List, Medication List and agree with Tech note.     Leighann Ayala, DOMINIC FAAO

## 2020-09-24 NOTE — PROGRESS NOTES
Patient came in for 2 Allergy injection. Patient had no reaction to last shots. I gave 0.2 ml of the silver bottle, 0.2 m of silver bottle  subcutaneous without any issues.   Patient instructed to wait in building for at least 30 min. after injections  Patients comes into clinic today at the request of Dr. Vides for allergy injections     No reaction to last injection.     This service provided today was under the direct supervision of Dr. Fish , who was available if needed.     AURORA PARDO LPN

## 2020-10-01 ENCOUNTER — ALLIED HEALTH/NURSE VISIT (OUTPATIENT)
Dept: ALLERGY | Facility: CLINIC | Age: 25
End: 2020-10-01
Payer: COMMERCIAL

## 2020-10-01 DIAGNOSIS — Z51.6 NEED FOR DESENSITIZATION TO ALLERGENS: Primary | ICD-10-CM

## 2020-10-01 PROCEDURE — 95125 IMMUNOTHERAPY 2/> INJECTIONS: CPT

## 2020-10-01 NOTE — PROGRESS NOTES
Patient came in for 2 Allergy injection. Patient had no reaction to last shots. I gave 0.3 ml of the silver bottle, and 0.3 ml of silbver subcutaneous without any issues.   Patient instructed to wait in building for at least 30 min. after injections  Patients comes into clinic today at the request of Dr. Vides for allergy injections     No reaction to last injection.     This service provided today was under the direct supervision of Dr. Fish , who was available if needed.     AURORA PARDO LPN

## 2020-10-08 ENCOUNTER — ALLIED HEALTH/NURSE VISIT (OUTPATIENT)
Dept: ALLERGY | Facility: CLINIC | Age: 25
End: 2020-10-08
Payer: COMMERCIAL

## 2020-10-08 DIAGNOSIS — Z51.6 NEED FOR DESENSITIZATION TO ALLERGENS: Primary | ICD-10-CM

## 2020-10-08 PROCEDURE — 95117 IMMUNOTHERAPY INJECTIONS: CPT

## 2020-10-08 NOTE — PROGRESS NOTES
Patient came in for 2 Allergy injection. Patient had no reaction to last shots. I gave 0.4 ml of the silver, and 0.4 ml of silver bottle subcutaneous without any issues.   Patient instructed to wait in building for at least 30 min. after injections  Patients comes into clinic today at the request of Dr. Vides for allergy injections     No reaction to last injection.     This service provided today was under the direct supervision of Dr. Fish , who was available if needed.     AURORA PARDO LPN

## 2020-10-13 ENCOUNTER — ALLIED HEALTH/NURSE VISIT (OUTPATIENT)
Dept: ALLERGY | Facility: CLINIC | Age: 25
End: 2020-10-13
Payer: COMMERCIAL

## 2020-10-13 DIAGNOSIS — Z51.6 NEED FOR DESENSITIZATION TO ALLERGENS: Primary | ICD-10-CM

## 2020-10-13 PROCEDURE — 99207 PR NO CHARGE NURSE ONLY: CPT | Performed by: DERMATOLOGY

## 2020-10-13 PROCEDURE — 95125 IMMUNOTHERAPY 2/> INJECTIONS: CPT | Performed by: DERMATOLOGY

## 2020-10-13 NOTE — PROGRESS NOTES
Patient came in for 2 Allergy injection. Patient had no reaction to last shots. I gave 0.5 ml of the silver bottle, and 0.5 ml of silver bottle  subcutaneous without any issues.   Patient instructed to wait in building for at least 30 min. after injections  Patients comes into clinic today at the request of Dr. Vides for allergy injections     No reaction to last injection.     This service provided today was under the direct supervision of Dr. Vides , who was available if needed.     AURORA PARDO LPN

## 2020-10-16 ENCOUNTER — ALLIED HEALTH/NURSE VISIT (OUTPATIENT)
Dept: ALLERGY | Facility: CLINIC | Age: 25
End: 2020-10-16
Payer: COMMERCIAL

## 2020-10-16 DIAGNOSIS — Z51.6 NEED FOR DESENSITIZATION TO ALLERGENS: Primary | ICD-10-CM

## 2020-10-16 PROCEDURE — 99207 PR NO CHARGE NURSE ONLY: CPT | Performed by: DERMATOLOGY

## 2020-10-16 PROCEDURE — 95125 IMMUNOTHERAPY 2/> INJECTIONS: CPT | Performed by: DERMATOLOGY

## 2020-10-16 NOTE — PROGRESS NOTES
Patient came in for 2 Allergy injection. Patient had no reaction to last shots. I gave 0.05 ml of the green bottle, and 0.05ml of green bottle  subcutaneous without any issues.   Patient instructed to wait in building for at least 30 min. after injections  Patients comes into clinic today at the request of DR. Vides for allergy injections     No reaction to last injection.     This service provided today was under the direct supervision of Dr. Vides , who was available if needed.     AURORA PARDO LPN

## 2020-10-20 ENCOUNTER — ALLIED HEALTH/NURSE VISIT (OUTPATIENT)
Dept: ALLERGY | Facility: CLINIC | Age: 25
End: 2020-10-20
Payer: COMMERCIAL

## 2020-10-20 DIAGNOSIS — Z51.6 NEED FOR DESENSITIZATION TO ALLERGENS: Primary | ICD-10-CM

## 2020-10-20 PROCEDURE — 99207 PR NO CHARGE NURSE ONLY: CPT | Performed by: DERMATOLOGY

## 2020-10-20 PROCEDURE — 95125 IMMUNOTHERAPY 2/> INJECTIONS: CPT | Performed by: DERMATOLOGY

## 2020-10-20 NOTE — PROGRESS NOTES
Patient came in for 2 Allergy injection. Patient had no reaction to last shots. I gave 0.1 ml of the green bottle subcutaneous without any issues.   Patient instructed to wait in building for at least 30 min. after injections  Patients comes into clinic today at the request of Dr. Vides for allergy injections     No reaction to last injection.     This service provided today was under the direct supervision of Dr. Vides , who was available if needed.     AURORA PARDO LPN

## 2020-10-29 ENCOUNTER — ALLIED HEALTH/NURSE VISIT (OUTPATIENT)
Dept: ALLERGY | Facility: CLINIC | Age: 25
End: 2020-10-29
Payer: COMMERCIAL

## 2020-10-29 DIAGNOSIS — Z51.6 NEED FOR DESENSITIZATION TO ALLERGENS: Primary | ICD-10-CM

## 2020-10-29 PROCEDURE — 95125 IMMUNOTHERAPY 2/> INJECTIONS: CPT

## 2020-10-29 NOTE — PROGRESS NOTES
Patient came in for 2 Allergy injection. Patient had no reaction to last shots. I gave 0.2 ml of the green bottle (x2) subcutaneous without any issues.   Patient instructed to wait in building for at least 30 min. after injections  Patients comes into clinic today at the request of Dr. Vides for allergy injections     No reaction to last injection.     This service provided today was under the direct supervision of Dr. Fish , who was available if needed.     AURORA PARDO LPN

## 2020-11-05 ENCOUNTER — ALLIED HEALTH/NURSE VISIT (OUTPATIENT)
Dept: ALLERGY | Facility: CLINIC | Age: 25
End: 2020-11-05
Payer: COMMERCIAL

## 2020-11-05 DIAGNOSIS — Z51.6 NEED FOR DESENSITIZATION TO ALLERGENS: Primary | ICD-10-CM

## 2020-11-05 PROCEDURE — 95125 IMMUNOTHERAPY 2/> INJECTIONS: CPT

## 2020-11-05 NOTE — PROGRESS NOTES
Patient came in for 2 Allergy injection. Patient had no reaction to last shots. I gave 0.3 ml of the green bottle subcutaneous (x2) without any issues.   Patient instructed to wait in building for at least 30 min. after injections  Patients comes into clinic today at the request of Dr. Vides for allergy injections     No reaction to last injection.     This service provided today was under the direct supervision of Dr. Fish , who was available if needed.     AURORA PARDO LPN

## 2020-11-10 ENCOUNTER — ALLIED HEALTH/NURSE VISIT (OUTPATIENT)
Dept: ALLERGY | Facility: CLINIC | Age: 25
End: 2020-11-10
Payer: COMMERCIAL

## 2020-11-10 DIAGNOSIS — Z51.6 NEED FOR DESENSITIZATION TO ALLERGENS: Primary | ICD-10-CM

## 2020-11-10 PROCEDURE — 95125 IMMUNOTHERAPY 2/> INJECTIONS: CPT | Performed by: DERMATOLOGY

## 2020-11-10 PROCEDURE — 99207 PR NO CHARGE NURSE ONLY: CPT | Performed by: DERMATOLOGY

## 2020-11-10 ASSESSMENT — PAIN SCALES - GENERAL: PAINLEVEL: NO PAIN (0)

## 2020-11-10 NOTE — NURSING NOTE
Dermatology Rooming Note    José Luis Palacios's goals for this visit include:   Chief Complaint   Patient presents with     Allergy Injection     Martha Kumar CMA

## 2020-11-10 NOTE — PROGRESS NOTES
Patient came in for 2 Allergy injection. Patient had no reaction to last shots. I gave 0.5 ml of the green bottle per Dr Vides's okay subcutaneous without any issues.   Patient instructed to wait in building for at least 30 min. after injections  Patients comes into clinic today at the request of Dr Vides for allergy injections     No reaction to last injection.     This service provided today was under the direct supervision of Dr Vides , who was available if needed.     Trish Black RN

## 2020-11-13 ENCOUNTER — ALLIED HEALTH/NURSE VISIT (OUTPATIENT)
Dept: ALLERGY | Facility: CLINIC | Age: 25
End: 2020-11-13
Payer: COMMERCIAL

## 2020-11-13 DIAGNOSIS — Z51.6 NEED FOR DESENSITIZATION TO ALLERGENS: Primary | ICD-10-CM

## 2020-11-13 PROCEDURE — 95125 IMMUNOTHERAPY 2/> INJECTIONS: CPT | Performed by: DERMATOLOGY

## 2020-11-13 PROCEDURE — 99207 PR NO CHARGE NURSE ONLY: CPT | Performed by: DERMATOLOGY

## 2020-11-13 NOTE — PROGRESS NOTES
Patient came in for 2  Allergy injection. Patient had no reaction to last shots. I gave 0.1 ml of the blue bottle subcutaneous without any issues.   Patient instructed to wait in building for at least 30 min. after injections  Patients comes into clinic today at the request of Dr Vides for allergy injections     No reaction to last injection.     This service provided today was under the direct supervision of Dr Vides , who was available if needed.     Trish Black RN

## 2020-11-17 ENCOUNTER — ALLIED HEALTH/NURSE VISIT (OUTPATIENT)
Dept: ALLERGY | Facility: CLINIC | Age: 25
End: 2020-11-17
Payer: COMMERCIAL

## 2020-11-17 DIAGNOSIS — Z51.6 NEED FOR DESENSITIZATION TO ALLERGENS: Primary | ICD-10-CM

## 2020-11-17 PROCEDURE — 99207 PR NO CHARGE LOS: CPT | Performed by: DERMATOLOGY

## 2020-11-17 PROCEDURE — 95125 IMMUNOTHERAPY 2/> INJECTIONS: CPT | Performed by: DERMATOLOGY

## 2020-11-17 NOTE — PROGRESS NOTES
Patient came in for 2 Allergy injection. Patient had no reaction to last shots. I gave 0.2 ml of the blue bottle subcutaneous without any issues.   Patient instructed to wait in building for at least 30 min. after injections  Patients comes into clinic today at the request of Dr Vides for allergy injections     No reaction to last injection.     This service provided today was under the direct supervision of Dr Vides , who was available if needed.     Trish Black RN

## 2020-11-18 ENCOUNTER — TELEPHONE (OUTPATIENT)
Dept: ALLERGY | Facility: CLINIC | Age: 25
End: 2020-11-18

## 2020-11-18 NOTE — TELEPHONE ENCOUNTER
ALLERGY SOLUTION RE-ORDER REQUEST    José Luis Palacios 1995 MRN: [id number]    DATE NEEDED:  ASAP  Vial Color Content    Vial Size  Blue 1:100, Yellow 1:10 and Red 1:1Dust Mite    5ml  Blue 1:100, Yellow 1:10 and Red 1:1Trees   5ml      Shot Clinic Location:  [facility]  Ship to Location: [facility]  Serum billed to:  [facility]    Special Instructions:  4.212 or Derm        Requester Signature  Iris Villegas LPN

## 2020-11-24 ENCOUNTER — ALLIED HEALTH/NURSE VISIT (OUTPATIENT)
Dept: ALLERGY | Facility: CLINIC | Age: 25
End: 2020-11-24
Payer: COMMERCIAL

## 2020-11-24 DIAGNOSIS — Z51.6 NEED FOR DESENSITIZATION TO ALLERGENS: Primary | ICD-10-CM

## 2020-11-24 PROCEDURE — 99207 PR NO CHARGE LOS: CPT | Performed by: DERMATOLOGY

## 2020-11-24 PROCEDURE — 95125 IMMUNOTHERAPY 2/> INJECTIONS: CPT | Performed by: DERMATOLOGY

## 2020-11-24 NOTE — PROGRESS NOTES
Patient came in for 2 Allergy injection. Patient had no reaction to last shots. I gave 0.3 ml of the blue bottle subcutaneous without any issues.   Patient instructed to wait in building for at least 30 min. after injections  Patients comes into clinic today at the request of Dr Vides for allergy injections     No reaction to last injection.     This service provided today was under the direct supervision of Dr Vides , who was available if needed.     Trish Black RN

## 2020-12-01 ENCOUNTER — ALLIED HEALTH/NURSE VISIT (OUTPATIENT)
Dept: ALLERGY | Facility: CLINIC | Age: 25
End: 2020-12-01
Payer: COMMERCIAL

## 2020-12-01 DIAGNOSIS — Z51.6 NEED FOR DESENSITIZATION TO ALLERGENS: Primary | ICD-10-CM

## 2020-12-01 PROCEDURE — 95125 IMMUNOTHERAPY 2/> INJECTIONS: CPT | Performed by: DERMATOLOGY

## 2020-12-01 PROCEDURE — 99207 PR NO CHARGE NURSE ONLY: CPT | Performed by: DERMATOLOGY

## 2020-12-01 NOTE — PROGRESS NOTES
Patient came in for 2 Allergy injection. Patient had no reaction to last shots. I gave 0.4 ml of the blue bottle subcutaneous without any issues.   Patient instructed to wait in building for at least 30 min. after injections  Patients comes into clinic today at the request of Dr Vides for allergy injections     No reaction to last injection.     This service provided today was under the direct supervision of Dr Vides , who was available if needed.     Trish Black RN

## 2020-12-08 ENCOUNTER — ALLIED HEALTH/NURSE VISIT (OUTPATIENT)
Dept: ALLERGY | Facility: CLINIC | Age: 25
End: 2020-12-08
Payer: COMMERCIAL

## 2020-12-08 DIAGNOSIS — Z51.6 NEED FOR DESENSITIZATION TO ALLERGENS: Primary | ICD-10-CM

## 2020-12-08 PROCEDURE — 99207 PR NO CHARGE NURSE ONLY: CPT | Performed by: DERMATOLOGY

## 2020-12-08 PROCEDURE — 95125 IMMUNOTHERAPY 2/> INJECTIONS: CPT | Performed by: DERMATOLOGY

## 2020-12-08 NOTE — LETTER
Dear José Luis Palacios,    Effective December 31, 2020, [facility] located at the [facility] will no longer be able to offer immunotherapy injections (allergy shots).  Immunotherapy injections will continue to be offered at our other [facility] clinics.  Please select one of the clinics listed below and we will send your immunotherapy serum to the clinic of your choice so you can continue receiving your immunotherapy injections.    [facility] has had to make the difficult decision to consolidate some clinical operations and close some of their clinics. This was a difficult decision to make, however [facility] is committed to offering you the safe high quality of care that you expect. Please accept our sincerest apologies for any inconvenience this change may cause.    [facility]:  [facility]  [address]      [address] [place] [address]      [place] [address]  Appointments:  [phone]    Appointments: [phone]    [facility]  [address]     [address] [place] [address]      [place] [address]  Appointments: [phone]    Appointments: [phone]    [facility]  [address]  [place] [address]  Appointments: [phone]    Sincerely,    Cayla Ortez  Patient Care Supervisor

## 2020-12-08 NOTE — PROGRESS NOTES
Patient came in for 2 Allergy injection. Patient had no reaction to last shots. I gave 0.5 ml of the blue bottle subcutaneous without any issues.   Patient instructed to wait in building for at least 30 min. after injections  Patients comes into clinic today at the request of  for allergy injections     No reaction to last injection.     This service provided today was under the direct supervision of  , who was available if needed.     Danielle Rudd RN

## 2020-12-16 ENCOUNTER — TELEPHONE (OUTPATIENT)
Dept: DERMATOLOGY | Facility: CLINIC | Age: 25
End: 2020-12-16

## 2020-12-16 ENCOUNTER — VIRTUAL VISIT (OUTPATIENT)
Dept: FAMILY MEDICINE | Facility: OTHER | Age: 25
End: 2020-12-16
Payer: COMMERCIAL

## 2020-12-16 DIAGNOSIS — Z20.822 SUSPECTED COVID-19 VIRUS INFECTION: Primary | ICD-10-CM

## 2020-12-16 DIAGNOSIS — Z20.822 SUSPECTED COVID-19 VIRUS INFECTION: ICD-10-CM

## 2020-12-16 PROCEDURE — 99421 OL DIG E/M SVC 5-10 MIN: CPT | Performed by: PREVENTIVE MEDICINE

## 2020-12-16 PROCEDURE — U0003 INFECTIOUS AGENT DETECTION BY NUCLEIC ACID (DNA OR RNA); SEVERE ACUTE RESPIRATORY SYNDROME CORONAVIRUS 2 (SARS-COV-2) (CORONAVIRUS DISEASE [COVID-19]), AMPLIFIED PROBE TECHNIQUE, MAKING USE OF HIGH THROUGHPUT TECHNOLOGIES AS DESCRIBED BY CMS-2020-01-R: HCPCS | Performed by: FAMILY MEDICINE

## 2020-12-16 NOTE — TELEPHONE ENCOUNTER
Attempted to reach patient regarding his allergy injection appointment on 12/17/2020. Patient is awaiting Covid-19 results. Due to patient not having results in time for appointment and also being ill we are unable to provide the injection. Phone hung up on this writer. Will send Sprinklrt message to patient.     Danielle Rudd RN

## 2020-12-16 NOTE — PROGRESS NOTES
"Date: 2020 10:21:51  Clinician: Brien Rivera  Clinician NPI: [id number]  Patient: José Luis Palacios  Patient : 1995  Patient Address: [address] [place] [address]  Patient Phone: [phone]  Visit Protocol: URI  Patient Summary:  José Luis is a 25 year old ( : 1995 ) male who initiated a [facility] Visit for COVID-19 (Coronavirus) evaluation and screening. When asked the question \"Please sign me up to receive news, health information and promotions from [facility].\", José Luis responded \"No\".    José Luis states his symptoms started 1-2 days ago.   His symptoms consist of nasal congestion and rhinitis.   Symptom details   Nasal secretions: The color of his mucus is clear.   José Luis denies having diarrhea, facial pain or pressure, myalgias, anosmia, ear pain, headache, wheezing, fever, cough, nausea, chills, malaise, sore throat, teeth pain, ageusia, and vomiting. He also denies having recent facial or sinus surgery in the past 60 days and taking antibiotic medication in the past month. He is not experiencing dyspnea.    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 In the past 14 days, José Luis has worked or volunteered at a hospital or a clinic. 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   José Luis has not had a close contact with a laboratory-confirmed COVID-19 patient within 14 days of symptom onset.    José Luis has been tested for COVID-19.      Date(s) of his COVID-19 test as reported by the patient (free text): 20       Result of COVID-19 test as reported by the patient (free text): Negative, no Covid-Antibodies found       Type of test as reported by the patient (free " text): Blood antigen, mailed through [employer]        Pertinent medical history  He has not been told by his provider to avoid NSAIDs.   oJsé Luis does not have diabetes. He denies having immunosuppressive conditions (e.g., chemotherapy, HIV, organ transplant, long-term use of steroids or other immunosuppressive medications, splenectomy). He denies having congestive heart failure and severe COPD. He does not have asthma.   José Luis needs a return to work/school note.   José Luis does not smoke or use smokeless tobacco.   Weight: 175 lbs    MEDICATIONS: Allegra Allergy oral, Flonase Allergy Relief nasal, ALLERGIES: NKDA  Clinician Response:  Dear José Luis,   Your symptoms show that you may have coronavirus (COVID-19). This illness can cause fever, cough and trouble breathing. Many people get a mild case and get better on their own. Some people can get very sick.  Based on the symptoms you have shared, I would like you to be re-checked in 2 to 3 days. Please call your family clinic to set up a video or phone visit.  Will I be tested for COVID-19?  We would like to test you for this virus.   Please call [phone] to schedule your visit. Explain that you were referred by [facility] to have a COVID-19 test. Be ready to share your [facility] visit ID number.   * If you need to schedule in [place] or [facility] please call [phone] or for [employer] employees please call [phone].    The following will serve as your written order for this COVID Test, ordered by me, for the indication of suspected COVID [Z20.828]: The test will be ordered in [employer], our electronic health record, after you are scheduled. It will show as ordered and authorized by Last Martell MD.  Order: COVID-19 (Coronavirus) PCR for SYMPTOMATIC testing from [facility].   1.When it's time for your COVID test:   Stay at least 6 feet away from others. (If someone will drive you to your test, stay in the backseat, as far away from the  as you can.)   Cover  "your mouth and nose with a mask, tissue or washcloth.  Go straight to the testing site. Don't make any stops on the way there or back.      2.Starting now: Stay home and away from others (self-isolate) until:   You've had no fever---and no medicine that reduces fever---for one full day (24 hours). And...   Your other symptoms have gotten better. For example, your cough or breathing has improved. And...   At least 10 days have passed since your symptoms started.       During this time, don't leave the house except for testing or medical care.   Stay in your own room, even for meals. Use your own bathroom if you can.   Stay away from others in your home. No hugging, kissing or shaking hands. No visitors.  Don't go to work, school or anywhere else.    Clean \"high touch\" surfaces often (doorknobs, counters, handles, etc.). Use a household cleaning spray or wipes. You'll find a full list of  on the EPA website: www.epa.gov/pesticide-registration/list-n-disinfectants-use-against-sars-cov-2.   Cover your mouth and nose with a mask, tissue or washcloth to avoid spreading germs.  Wash your hands and face often. Use soap and water.  Caregivers in these groups are at risk for severe illness due to COVID-19:  o People 65 years and older  o People who live in a nursing home or long-term care facility  o People with chronic disease (lung, heart, cancer, diabetes, kidney, liver, immunologic)   o People who have a weakened immune system, including those who:   Are in cancer treatment  Take medicine that weakens the immune system, such as corticosteroids  Had a bone marrow or organ transplant  Have an immune deficiency  Have poorly controlled HIV or AIDS  Are obese (body mass index of 40 or higher)  Smoke regularly   o Caregivers should wear gloves while washing dishes, handling laundry and cleaning bedrooms and bathrooms.  o Use caution when washing and drying laundry: Don't shake dirty laundry, and use the warmest water " setting that you can.  o For more tips, go to www.cdc.gov/coronavirus/2019-ncov/downloads/10Things.pdf.      How can I take care of myself?   Get lots of rest. Drink extra fluids (unless a doctor has told you not to)   Take Tylenol (acetaminophen) for fever or pain. If you have liver or kidney problems, ask your family doctor if it's okay to take Tylenol.   Adults can take either:    650 mg (two 325 mg pills) every 4 to 6 hours, or...   1,000 mg (two 500 mg pills) every 8 hours as needed.    Note: Don't take more than 3,000 mg in one day. Acetaminophen is found in many medicines (both prescribed and over-the-counter medicines). Read all labels to be sure you don't take too much.   For children, check the Tylenol bottle for the right dose. The dose is based on the child's age or weight.    If you have other health problems (like cancer, heart failure, an organ transplant or severe kidney disease): Call your specialty clinic if you don't feel better in the next 2 days.       Know when to call 911. Emergency warning signs include:    Trouble breathing or shortness of breath Pain or pressure in the chest that doesn't go away Feeling confused like you haven't felt before, or not being able to wake up Bluish-colored lips or face  Where can I get more information?   [facility] -- About COVID-19: [email or web address]   CDC -- What to Do If You're Sick: www.cdc.gov/coronavirus/2019-ncov/about/steps-when-sick.html   CDC -- Ending Home Isolation: www.cdc.gov/coronavirus/2019-ncov/hcp/disposition-in-home-patients.html   CDC -- Caring for Someone: www.cdc.gov/coronavirus/2019-ncov/if-you-are-sick/care-for-someone.html   [facility] -- Interim Guidance for Hospital Discharge to Home: www.health.[facility].[place]/diseases/coronavirus/hcp/hospdischarge.pdf   [facility] clinical trials (COVID-19 research studies): clinicalaffairs.[facility]/umn-clinical-trials    Below are the COVID-19 hotlines at the [place]  Department of Health ([facility]). Interpreters are available.    For health questions: Call 836-003-6840 or 1-698.271.6340 (7 a.m. to 7 p.m.) For questions about schools and childcare: Call 493-711-6374 or 1-474.248.2155 (7 a.m. to 7 p.m.)       Diagnosis: Nasal congestion  Diagnosis ICD: R09.81

## 2020-12-17 LAB
SARS-COV-2 RNA SPEC QL NAA+PROBE: NOT DETECTED
SPECIMEN SOURCE: NORMAL

## 2020-12-18 ENCOUNTER — ALLIED HEALTH/NURSE VISIT (OUTPATIENT)
Dept: ALLERGY | Facility: CLINIC | Age: 25
End: 2020-12-18
Payer: COMMERCIAL

## 2020-12-18 DIAGNOSIS — Z51.6 NEED FOR DESENSITIZATION TO ALLERGENS: Primary | ICD-10-CM

## 2020-12-18 PROCEDURE — 95125 IMMUNOTHERAPY 2/> INJECTIONS: CPT | Performed by: DERMATOLOGY

## 2020-12-18 PROCEDURE — 99207 PR NO CHARGE NURSE ONLY: CPT | Performed by: DERMATOLOGY

## 2020-12-18 NOTE — PROGRESS NOTES
Patient came in for 2 Allergy injection. Patient had no reaction to last shots. I gave 0.5 ml of the red bottle subcutaneous without any issues.   Patient instructed to wait in building for at least 30 min. after injections  Patients comes into clinic today at the request of  for allergy injections     No reaction to last injection.     This service provided today was under the direct supervision of  , who was available if needed.     Danielle Rudd RN

## 2020-12-27 ENCOUNTER — HEALTH MAINTENANCE LETTER (OUTPATIENT)
Age: 25
End: 2020-12-27

## 2020-12-29 ENCOUNTER — ALLIED HEALTH/NURSE VISIT (OUTPATIENT)
Dept: ALLERGY | Facility: CLINIC | Age: 25
End: 2020-12-29
Payer: COMMERCIAL

## 2020-12-29 DIAGNOSIS — Z51.6 NEED FOR DESENSITIZATION TO ALLERGENS: Primary | ICD-10-CM

## 2020-12-29 PROCEDURE — 99207 PR NO CHARGE NURSE ONLY: CPT | Performed by: DERMATOLOGY

## 2020-12-29 PROCEDURE — 95125 IMMUNOTHERAPY 2/> INJECTIONS: CPT | Performed by: DERMATOLOGY

## 2020-12-29 NOTE — PROGRESS NOTES
Patient came in for 2 Allergy injection. Patient had no reaction to last shots. I gave 0.1 ml of the yellow bottle subcutaneous without any issues.   Patient instructed to wait in building for at least 30 min. after injections  Patients comes into clinic today at the request of  for allergy injections     No reaction to last injection.     This service provided today was under the direct supervision of  , who was available if needed.     Danielle Rudd RN

## 2021-01-08 ENCOUNTER — ALLIED HEALTH/NURSE VISIT (OUTPATIENT)
Dept: ALLERGY | Facility: CLINIC | Age: 26
End: 2021-01-08
Payer: COMMERCIAL

## 2021-01-08 DIAGNOSIS — Z51.6 NEED FOR DESENSITIZATION TO ALLERGENS: Primary | ICD-10-CM

## 2021-01-08 PROCEDURE — 99207 PR NO CHARGE NURSE ONLY: CPT

## 2021-01-08 PROCEDURE — 95125 IMMUNOTHERAPY 2/> INJECTIONS: CPT

## 2021-01-08 NOTE — PROGRESS NOTES
Patient came in for 2 Allergy injection. Patient had no reaction to last shots. I gave 0.2 ml of the yellow bottle subcutaneous without any issues.   Patient instructed to wait in building for at least 30 min. after injections  Patients comes into clinic today at the request of  for allergy injections     No reaction to last injection.     This service provided today was under the direct supervision of  , who was available if needed.     Danielle Rudd RN

## 2021-01-12 ENCOUNTER — ALLIED HEALTH/NURSE VISIT (OUTPATIENT)
Dept: ALLERGY | Facility: CLINIC | Age: 26
End: 2021-01-12
Payer: COMMERCIAL

## 2021-01-12 DIAGNOSIS — Z51.6 NEED FOR DESENSITIZATION TO ALLERGENS: Primary | ICD-10-CM

## 2021-01-12 PROCEDURE — 95125 IMMUNOTHERAPY 2/> INJECTIONS: CPT | Performed by: DERMATOLOGY

## 2021-01-12 PROCEDURE — 99207 PR NO CHARGE NURSE ONLY: CPT | Performed by: DERMATOLOGY

## 2021-01-12 NOTE — PROGRESS NOTES
Patient came in for 2 Allergy injection. Patient had no reaction to last shots. I gave 0.3 ml of the yellow bottle subcutaneous without any issues.   Patient instructed to wait in building for at least 30 min. after injections  Patients comes into clinic today at the request of  for allergy injections     No reaction to last injection.     This service provided today was under the direct supervision of  , who was available if needed.     Danielle Rudd RN

## 2021-01-22 ENCOUNTER — ALLIED HEALTH/NURSE VISIT (OUTPATIENT)
Dept: ALLERGY | Facility: CLINIC | Age: 26
End: 2021-01-22
Payer: COMMERCIAL

## 2021-01-22 DIAGNOSIS — Z51.6 NEED FOR DESENSITIZATION TO ALLERGENS: Primary | ICD-10-CM

## 2021-01-22 PROCEDURE — 95125 IMMUNOTHERAPY 2/> INJECTIONS: CPT | Performed by: DERMATOLOGY

## 2021-01-22 PROCEDURE — 99207 PR NO CHARGE NURSE ONLY: CPT | Performed by: DERMATOLOGY

## 2021-01-22 NOTE — PROGRESS NOTES
Patient came in for 2 Allergy injection. Patient had no reaction to last shots. I gave 0.4 ml of the yellow bottle subcutaneous without any issues.   Patient instructed to wait in building for at least 30 min. after injections  Patients comes into clinic today at the request of Dr Vides for allergy injections     No reaction to last injection.     This service provided today was under the direct supervision of Dr Vides , who was available if needed.     Trish Black RN

## 2021-01-29 ENCOUNTER — ALLIED HEALTH/NURSE VISIT (OUTPATIENT)
Dept: ALLERGY | Facility: CLINIC | Age: 26
End: 2021-01-29
Payer: COMMERCIAL

## 2021-01-29 DIAGNOSIS — Z51.6 NEED FOR DESENSITIZATION TO ALLERGENS: Primary | ICD-10-CM

## 2021-01-29 PROCEDURE — 95125 IMMUNOTHERAPY 2/> INJECTIONS: CPT | Performed by: DERMATOLOGY

## 2021-01-29 PROCEDURE — 99207 PR NO CHARGE LOS: CPT | Performed by: DERMATOLOGY

## 2021-01-29 NOTE — PROGRESS NOTES
Patient came in for 2 Allergy injection. Patient had no reaction to last shots. I gave 0.5 ml of the yellow bottle subcutaneous without any issues.   Patient instructed to wait in building for at least 30 min. after injections  Patients comes into clinic today at the request of Dr Vides for allergy injections     No reaction to last injection.     This service provided today was under the direct supervision of Dr Vides , who was available if needed.     Trish Black RN

## 2021-02-12 ENCOUNTER — ALLIED HEALTH/NURSE VISIT (OUTPATIENT)
Dept: ALLERGY | Facility: CLINIC | Age: 26
End: 2021-02-12
Payer: COMMERCIAL

## 2021-02-12 DIAGNOSIS — Z51.6 NEED FOR DESENSITIZATION TO ALLERGENS: Primary | ICD-10-CM

## 2021-02-12 PROCEDURE — 95125 IMMUNOTHERAPY 2/> INJECTIONS: CPT | Performed by: DERMATOLOGY

## 2021-02-12 PROCEDURE — 99207 PR NO CHARGE NURSE ONLY: CPT | Performed by: DERMATOLOGY

## 2021-02-12 NOTE — PROGRESS NOTES
Patient came in for 2 Allergy injection. Patient had no reaction to last shots. I gave 0.1 ml of the red bottle subcutaneous without any issues.   Patient instructed to wait in building for at least 30 min. after injections  Patients comes into clinic today at the request of  for allergy injections     No reaction to last injection.     This service provided today was under the direct supervision of  , who was available if needed.     Danielle Rudd RN

## 2021-02-19 ENCOUNTER — ALLIED HEALTH/NURSE VISIT (OUTPATIENT)
Dept: ALLERGY | Facility: CLINIC | Age: 26
End: 2021-02-19
Payer: COMMERCIAL

## 2021-02-19 DIAGNOSIS — Z51.6 NEED FOR DESENSITIZATION TO ALLERGENS: Primary | ICD-10-CM

## 2021-02-19 PROCEDURE — 99207 PR NO CHARGE NURSE ONLY: CPT

## 2021-02-19 PROCEDURE — 95125 IMMUNOTHERAPY 2/> INJECTIONS: CPT

## 2021-02-19 NOTE — PROGRESS NOTES
Patient came in for 2 Allergy injection. Patient had no reaction to last shots. I gave 0.2 ml of the red bottle subcutaneous without any issues.   Patient instructed to wait in building for at least 30 min. after injections  Patients comes into clinic today at the request of Dr Vides for allergy injections     No reaction to last injection.     This service provided today was under the direct supervision of Dr Vides , who was available if needed.     Trish Black RN

## 2021-03-02 ENCOUNTER — ALLIED HEALTH/NURSE VISIT (OUTPATIENT)
Dept: ALLERGY | Facility: CLINIC | Age: 26
End: 2021-03-02
Payer: COMMERCIAL

## 2021-03-02 DIAGNOSIS — Z51.6 NEED FOR DESENSITIZATION TO ALLERGENS: Primary | ICD-10-CM

## 2021-03-02 PROCEDURE — 95125 IMMUNOTHERAPY 2/> INJECTIONS: CPT | Performed by: DERMATOLOGY

## 2021-03-02 PROCEDURE — 99207 PR NO CHARGE NURSE ONLY: CPT | Performed by: DERMATOLOGY

## 2021-03-02 NOTE — PROGRESS NOTES
Patient came in for 2 Allergy injection. Patient had no reaction to last shots. I gave 0.3 ml of the red bottle subcutaneous without any issues.   Patient instructed to wait in building for at least 30 min. after injections  Patients comes into clinic today at the request of Dr Vieds for allergy injections     No reaction to last injection.     This service provided today was under the direct supervision of Dr Vides , who was available if needed.     Trish Black RN

## 2021-03-06 ENCOUNTER — HEALTH MAINTENANCE LETTER (OUTPATIENT)
Age: 26
End: 2021-03-06

## 2021-03-23 ENCOUNTER — ALLIED HEALTH/NURSE VISIT (OUTPATIENT)
Dept: ALLERGY | Facility: CLINIC | Age: 26
End: 2021-03-23
Payer: COMMERCIAL

## 2021-03-23 DIAGNOSIS — Z51.6 NEED FOR DESENSITIZATION TO ALLERGENS: Primary | ICD-10-CM

## 2021-03-23 PROCEDURE — 95125 IMMUNOTHERAPY 2/> INJECTIONS: CPT | Performed by: DERMATOLOGY

## 2021-03-23 PROCEDURE — 99207 PR NO CHARGE NURSE ONLY: CPT | Performed by: DERMATOLOGY

## 2021-03-23 NOTE — PROGRESS NOTES
Patient came in for 2 Allergy injection. Patient had small local reaction on R arm to last shots. Pt reports he used anti-itch cream and resolved in a day.  I gave 0.3 ml and 0.15 ml of the red bottle subcutaneous without any issues.   Patient instructed to wait in building for at least 30 min. after injections  Patients comes into clinic today at the request of Dr Vides for allergy injections        This service provided today was under the direct supervision of Dr Vides , who was available if needed.     Trish Black RN

## 2021-04-02 ENCOUNTER — ALLIED HEALTH/NURSE VISIT (OUTPATIENT)
Dept: ALLERGY | Facility: CLINIC | Age: 26
End: 2021-04-02
Payer: COMMERCIAL

## 2021-04-02 DIAGNOSIS — Z51.6 NEED FOR DESENSITIZATION TO ALLERGENS: Primary | ICD-10-CM

## 2021-04-02 PROCEDURE — 99207 PR NO CHARGE NURSE ONLY: CPT | Performed by: DERMATOLOGY

## 2021-04-02 PROCEDURE — 95125 IMMUNOTHERAPY 2/> INJECTIONS: CPT | Performed by: DERMATOLOGY

## 2021-04-02 NOTE — PROGRESS NOTES
Patient came in for 2 Allergy injection. Patient had no reaction to last shots. I gave 0.15 and 0.4 ml of the red bottle subcutaneous without any issues.   Patient instructed to wait in building for at least 30 min. after injections  Patients comes into clinic today at the request of Dr Vides for allergy injections     No reaction to last injection.     This service provided today was under the direct supervision of Dr Vides , who was available if needed.     Trish Black RN

## 2021-04-16 ENCOUNTER — ALLIED HEALTH/NURSE VISIT (OUTPATIENT)
Dept: ALLERGY | Facility: CLINIC | Age: 26
End: 2021-04-16
Payer: COMMERCIAL

## 2021-04-16 DIAGNOSIS — Z51.6 NEED FOR DESENSITIZATION TO ALLERGENS: Primary | ICD-10-CM

## 2021-04-16 PROCEDURE — 99207 PR NO CHARGE NURSE ONLY: CPT | Performed by: DERMATOLOGY

## 2021-04-16 PROCEDURE — 95120 IMMUNOTHERAPY ONE INJECTION: CPT | Performed by: DERMATOLOGY

## 2021-04-16 NOTE — PROGRESS NOTES
Patient came in for 1 Allergy injection. Patient had no reaction to last shots. I gave .5 ml of the Red bottle subcutaneous without any issues.   Patient instructed to wait in building for at least 30 min. after injections  Patients comes into clinic today at the request of Dr. Vides for allergy injections     No reaction to last injection.     This service provided today was under the direct supervision of Dr. Vides , who was available if needed.     Janice Bedoya RN

## 2021-05-06 ENCOUNTER — HOSPITAL ENCOUNTER (EMERGENCY)
Facility: CLINIC | Age: 26
Discharge: HOME | End: 2021-05-06
Attending: EMERGENCY MEDICINE | Admitting: EMERGENCY MEDICINE
Payer: OTHER MISCELLANEOUS

## 2021-05-06 VITALS
OXYGEN SATURATION: 100 % | WEIGHT: 175 LBS | HEART RATE: 74 BPM | BODY MASS INDEX: 22.22 KG/M2 | DIASTOLIC BLOOD PRESSURE: 77 MMHG | RESPIRATION RATE: 17 BRPM | SYSTOLIC BLOOD PRESSURE: 148 MMHG | TEMPERATURE: 98.9 F

## 2021-05-06 DIAGNOSIS — Z77.21 EXPOSURE TO BODY FLUID: ICD-10-CM

## 2021-05-06 PROCEDURE — 99282 EMERGENCY DEPT VISIT SF MDM: CPT | Performed by: EMERGENCY MEDICINE

## 2021-05-06 PROCEDURE — 99282 EMERGENCY DEPT VISIT SF MDM: CPT
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
## 2021-05-11 ENCOUNTER — ALLIED HEALTH/NURSE VISIT (OUTPATIENT)
Dept: ALLERGY | Facility: CLINIC | Age: 26
End: 2021-05-11
Payer: OTHER MISCELLANEOUS

## 2021-05-11 DIAGNOSIS — Z51.6 NEED FOR DESENSITIZATION TO ALLERGENS: Primary | ICD-10-CM

## 2021-05-11 PROCEDURE — 95125 IMMUNOTHERAPY 2/> INJECTIONS: CPT | Performed by: DERMATOLOGY

## 2021-05-11 PROCEDURE — 99207 PR NO CHARGE NURSE ONLY: CPT | Performed by: DERMATOLOGY

## 2021-05-11 NOTE — NURSING NOTE
Patient came in for 2 Allergy injection. Patient had no reaction to last shots. I gave 0.1 ml of the red bottle and 0.5ml of the red subcutaneous without any issues.  Patient instructed to wait in building for at least 30 min. after injections  Patients comes into clinic today at the request of Dr. Vides for allergy injections     No reaction to last injection.     This service provided today was under the direct supervision of Dr. Granger , who was available if needed.     Eugenia Gamino CMA

## 2021-06-08 ENCOUNTER — ALLIED HEALTH/NURSE VISIT (OUTPATIENT)
Dept: ALLERGY | Facility: CLINIC | Age: 26
End: 2021-06-08
Payer: OTHER MISCELLANEOUS

## 2021-06-08 DIAGNOSIS — Z51.6 NEED FOR DESENSITIZATION TO ALLERGENS: Primary | ICD-10-CM

## 2021-06-08 PROCEDURE — 95125 IMMUNOTHERAPY 2/> INJECTIONS: CPT | Performed by: DERMATOLOGY

## 2021-06-08 PROCEDURE — 99207 PR NO CHARGE NURSE ONLY: CPT | Performed by: DERMATOLOGY

## 2021-06-08 NOTE — PROGRESS NOTES
Patient came in for 2 Allergy injection. Patient had no reaction to last shots. I gave 0.1;0.5 ml of the Red bottle subcutaneous without any issues.   Patient instructed to wait in building for at least 30 min. after injections  Patients comes into clinic today at the request of Dr. Vides for allergy injections     No reaction to last injection.     This service provided today was under the direct supervision of  , who was available if needed.     Janice Bedoya RN

## 2021-06-08 NOTE — NURSING NOTE
Dermatology Rooming Note    José Luis Palacios's goals for this visit include:   Chief Complaint   Patient presents with     Allergy Injection

## 2021-06-28 ENCOUNTER — TELEPHONE (OUTPATIENT)
Dept: ALLERGY | Facility: CLINIC | Age: 26
End: 2021-06-28

## 2021-06-28 NOTE — TELEPHONE ENCOUNTER
[facility] Call Center    Phone Message    May a detailed message be left on voicemail: yes     Reason for Call: Other: Pt needs to schedule injections     Action Taken: Message routed to:  [facility]): Allergy    Travel Screening: Not Applicable

## 2021-07-06 ENCOUNTER — ALLIED HEALTH/NURSE VISIT (OUTPATIENT)
Dept: ALLERGY | Facility: CLINIC | Age: 26
End: 2021-07-06
Payer: COMMERCIAL

## 2021-07-06 DIAGNOSIS — Z51.6 NEED FOR DESENSITIZATION TO ALLERGENS: Primary | ICD-10-CM

## 2021-07-06 PROCEDURE — 95125 IMMUNOTHERAPY 2/> INJECTIONS: CPT | Performed by: DERMATOLOGY

## 2021-07-06 PROCEDURE — 99207 PR NO CHARGE NURSE ONLY: CPT | Performed by: DERMATOLOGY

## 2021-07-06 NOTE — PROGRESS NOTES
Patient came in for 2 Allergy injection. Patient had no reaction to last shots. I gave 0.1 and 0.5 ml of the red bottle subcutaneous without any issues.   Patient instructed to wait in building for at least 30 min. after injections  Patients comes into clinic today at the request of Dr. Vides for allergy injections     No reaction to last injection.     This service provided today was under the direct supervision of Dr. Vides , who was available if needed.     Eugenia Gamino [facility]

## 2021-07-16 ENCOUNTER — ALLIED HEALTH/NURSE VISIT (OUTPATIENT)
Dept: ALLERGY | Facility: CLINIC | Age: 26
End: 2021-07-16
Payer: OTHER MISCELLANEOUS

## 2021-07-16 DIAGNOSIS — Z51.6 NEED FOR DESENSITIZATION TO ALLERGENS: Primary | ICD-10-CM

## 2021-07-16 PROCEDURE — 99207 PR NO CHARGE NURSE ONLY: CPT | Performed by: DERMATOLOGY

## 2021-07-16 PROCEDURE — 95120 IMMUNOTHERAPY ONE INJECTION: CPT | Performed by: DERMATOLOGY

## 2021-07-16 NOTE — PROGRESS NOTES
Patient came in for 1 Allergy injection. Patient had no reaction to last shots. I gave 0.2 ml of the red bottle subcutaneous without any issues.   Patient instructed to wait in building for at least 30 min. after injections  Patients comes into clinic today at the request of Dr. Vides for allergy injections     No reaction to last injection.     This service provided today was under the direct supervision of Dr. Vides , who was available if needed.     Eugenia Gamino CMA

## 2021-07-19 NOTE — PROGRESS NOTES
Assessment/Plan:   Giles is a 26 year old male here for physical    1. Routine adult health maintenance  Physical completed today. All history reviewed and updated in EMR. No labs indicated today. Up to date with immunizations.    2. White coat syndrome without HTN  Hx elevated blood pressures, thought to be white coat hypertension. Reports normal BPs at home, not taking medication. BP normal today. Will continue to monitor pt closely.       I have had an Advance Directives discussion with the patient.    Follow up: 1 year for physical, sooner as needed    Emi Rodriguez MD  [facility]      Subjective:     José Luis Palacios is a 26 year old male who presents for an annual exam.     Answers for HPI/ROS submitted by the patient on 7/20/2021  If you checked off any problems, how difficult have these problems made it for you to do your work, take care of things at home, or get along with other people?: Not difficult at all  PHQ9 TOTAL SCORE: 0  FRACISCO 7 TOTAL SCORE: 0  Frequency of exercise:: 6-7 days/week  Getting at least 3 servings of Calcium per day:: Yes  Diet:: Regular (no restrictions)  Taking medications regularly:: Yes  Medication side effects:: Not applicable  Bi-annual eye exam:: Yes  Dental care twice a year:: Yes  Sleep apnea or symptoms of sleep apnea:: None  Additional concerns today:: Yes - tonsil stones, started a few years ago (college)  Duration of exercise:: Greater than 60 minutes    Home BPs mainly 120s - sometimes elevated at office but no hx HTN  Works as nurse at [employer]    Healthy Habits:   Sunscreen Use: Yes  Seat Belt: Yes    Health Maintenance reviewed:  Lipid Profile: no  Glucose Screen: no  Colonoscopy: No    Immunization History   Administered Date(s) Administered     COVID-19,PF,Pfizer 12/22/2020, 01/08/2021     Dtp,hib Conjugate,hep B  08/15/1997, 05/25/2001     HPV Quadrivalent 06/06/2014, 07/17/2014, 12/16/2014     Hep B, Peds or Adolescent 1996, 1996,  1996     HepA-ped 2 Dose 07/15/2008, 01/26/2009     HepB, Unspecified 04/16/2015     HepB-Adult 04/16/2015, 05/18/2015     Historical HIB 1996, 1996, 1996     Influenza (H1N1) 01/10/2010     Influenza (IIV3) PF 10/01/2015     Influenza Vaccine IM > 6 months Valent IIV4 09/29/2016, 10/03/2017, 10/05/2019     Influenza Vaccine, 6+MO IM (QUADRIVALENT W/PRESERVATIVES) 10/21/2019     Influenza,INJ,MDCK,PF,Quad >4yrs 10/09/2020     MMR 05/16/1997, 05/25/2001     Meningococcal (Menactra ) 07/15/2008     Meningococcal (Menveo ) 06/06/2014     OPV, unspecified 1996, 1996, 1996     Poliovirus, inactivated (IPV) 05/25/2001     TDAP Vaccine (Adacel) 05/31/2016     TRIHIBIT (DTAP/HIB, <7y) 08/15/1997     Tdap (Adacel,Boostrix) 06/07/2007, 05/31/2016     Varicella 05/16/1997, 06/07/2007     Immunization status: up to date.    Current Outpatient Medications   Medication Sig Dispense Refill     fexofenadine (ALLEGRA) 180 MG tablet Take 1 tablet (180 mg) by mouth daily 30 tablet 3     fluticasone (FLONASE) 50 MCG/ACT nasal spray Spray 1 spray into both nostrils daily 16 g 1     ORDER FOR ALLERGEN IMMUNOTHERAPY Name of Mix: ALK STMM standardized extract STMM  50% D. Farinae and 50% D. Pteronyssinus  Stock solution (red) 10,000AU  5ml    Please provide as well following dilutions (5ml each)  1) silver = 1:10,000  2) green = 1:,1000  3) blue = 1:100  4) yellow = 1:10  5) red = 1:1    Diluent: HSA 5 mL PRN     ORDER FOR ALLERGEN IMMUNOTHERAPY Name of Mix: ALK 9 tree mix center Al alum (Helen, White Nic, Black Birch, American Elm, Shagbark  Hickory, Maple (Sugar), White Oak, White Zephyr, American  Mosheim)    Stock solution (red) 10,000AU  5ml    Please provide as well following dilutions (5ml each)  1) silver = 1:10,000  2) green = 1:,1000  3) blue = 1:100  4) yellow = 1:10  5) red = 1:1    Diluent: HSA 5 mL PRN     predniSONE (DELTASONE) 50 MG tablet Emergency set: if severe allergic reaction  take immediately 100mg Prednisone (2x50mg) and 2 Tabl Allegra 180mg. 2 tablet 3     Past Medical History:   Diagnosis Date     Acne      Hx of kidney disease     ureter issue, resolved     Past Surgical History:   Procedure Laterality Date     NO HISTORY OF SURGERY       Patient has no known allergies.  Family History   Problem Relation Age of Onset     Hypertension Father      Mitral valve prolapse Father         regurg     Skin Cancer Mother         sun related     No Known Problems Sister      No Known Problems Maternal Grandmother      Hypertension Maternal Grandfather      Breast Cancer Paternal Grandmother      Coronary Artery Disease Paternal Grandfather         heart attack     Suicide Maternal Uncle      Glaucoma No family hx of      Macular Degeneration No family hx of      Social History     Socioeconomic History     Marital status: Single     Spouse name: Not on file     Number of children: Not on file     Years of education: Not on file     Highest education level: Not on file   Occupational History     Not on file   Tobacco Use     Smoking status: Never Smoker     Smokeless tobacco: Never Used   Substance and Sexual Activity     Alcohol use: Yes     Alcohol/week: 1.0 - 5.0 standard drinks     Types: 1 - 5 Standard drinks or equivalent per week     Drug use: Yes     Types: Marijuana     Comment: occasionally, once/month     Sexual activity: Never   Other Topics Concern     Parent/sibling w/ CABG, MI or angioplasty before 65F 55M? No   Social History Narrative     Not on file     Social Determinants of Health     Financial Resource Strain:      Difficulty of Paying Living Expenses:    Food Insecurity:      Worried About Running Out of Food in the Last Year:      Ran Out of Food in the Last Year:    Transportation Needs:      Lack of Transportation (Medical):      Lack of Transportation (Non-Medical):    Physical Activity:      Days of Exercise per Week:      Minutes of Exercise per Session:    Stress:   "    Feeling of Stress :    Social Connections:      Frequency of Communication with Friends and Family:      Frequency of Social Gatherings with Friends and Family:      Attends Yazdanism Services:      Active Member of Clubs or Organizations:      Attends Club or Organization Meetings:      Marital Status:    Intimate Partner Violence:      Fear of Current or Ex-Partner:      Emotionally Abused:      Physically Abused:      Sexually Abused:        Review of Systems negative unless noted    Objective:        Vitals:    07/20/21 0800   BP: 128/70   Pulse: 66   SpO2: 99%   Weight: 76.7 kg (169 lb)   Height: 1.88 m (6' 2\")     Body mass index is 21.7 kg/m .    Physical Exam:  General Appearance: Alert, pleasant, appears stated age  Head: Normocephalic, without obvious abnormality  Eyes: PERRL, conjunctiva/corneas clear, EOM's intact  Ears: Normal TM's and external ear canals, both ears  Nose: Nares normal, septum midline,mucosa normal, no drainage  Throat: Lips, mucosa, and tongue normal; teeth and gums normal; oropharynx is clear  Neck: Supple,without lymphadenopathy, no thyromegaly or nodules noted  Lungs: Clear to auscultation bilaterally, respirations unlabored, no wheezing or crackles  Heart: Regular rate and rhythm, no murmur   Abdomen: Soft, non-tender, no masses, no organomegaly  Genitourinary: Testes of normal size without masses, no inguinal hernias  Extremities: Extremities with strong and symmetric pulses, no cyanosis or edema  Skin: Skin color, texture normal, no rashes or lesions  Neurologic: Grossly normal, no focal deficits    "

## 2021-07-20 ENCOUNTER — OFFICE VISIT (OUTPATIENT)
Dept: FAMILY MEDICINE | Facility: CLINIC | Age: 26
End: 2021-07-20
Payer: COMMERCIAL

## 2021-07-20 VITALS
WEIGHT: 169 LBS | DIASTOLIC BLOOD PRESSURE: 70 MMHG | BODY MASS INDEX: 21.69 KG/M2 | HEART RATE: 66 BPM | HEIGHT: 74 IN | OXYGEN SATURATION: 99 % | SYSTOLIC BLOOD PRESSURE: 128 MMHG

## 2021-07-20 DIAGNOSIS — R03.0 WHITE COAT SYNDROME WITHOUT HYPERTENSION: ICD-10-CM

## 2021-07-20 DIAGNOSIS — Z00.00 ROUTINE ADULT HEALTH MAINTENANCE: Primary | ICD-10-CM

## 2021-07-20 PROBLEM — T78.40XA ALLERGIES: Status: ACTIVE | Noted: 2021-07-20

## 2021-07-20 PROCEDURE — 99395 PREV VISIT EST AGE 18-39: CPT | Performed by: FAMILY MEDICINE

## 2021-07-20 RX ORDER — CLOTRIMAZOLE 1 %
CREAM (GRAM) TOPICAL
COMMUNITY
Start: 2021-05-20 | End: 2021-07-20

## 2021-07-20 ASSESSMENT — PATIENT HEALTH QUESTIONNAIRE - PHQ9
SUM OF ALL RESPONSES TO PHQ QUESTIONS 1-9: 0
SUM OF ALL RESPONSES TO PHQ QUESTIONS 1-9: 0
10. IF YOU CHECKED OFF ANY PROBLEMS, HOW DIFFICULT HAVE THESE PROBLEMS MADE IT FOR YOU TO DO YOUR WORK, TAKE CARE OF THINGS AT HOME, OR GET ALONG WITH OTHER PEOPLE: NOT DIFFICULT AT ALL

## 2021-07-20 ASSESSMENT — ANXIETY QUESTIONNAIRES
8. IF YOU CHECKED OFF ANY PROBLEMS, HOW DIFFICULT HAVE THESE MADE IT FOR YOU TO DO YOUR WORK, TAKE CARE OF THINGS AT HOME, OR GET ALONG WITH OTHER PEOPLE?: NOT DIFFICULT AT ALL
6. BECOMING EASILY ANNOYED OR IRRITABLE: NOT AT ALL
GAD7 TOTAL SCORE: 0
1. FEELING NERVOUS, ANXIOUS, OR ON EDGE: NOT AT ALL
4. TROUBLE RELAXING: NOT AT ALL
7. FEELING AFRAID AS IF SOMETHING AWFUL MIGHT HAPPEN: NOT AT ALL
5. BEING SO RESTLESS THAT IT IS HARD TO SIT STILL: NOT AT ALL
GAD7 TOTAL SCORE: 0
7. FEELING AFRAID AS IF SOMETHING AWFUL MIGHT HAPPEN: NOT AT ALL
GAD7 TOTAL SCORE: 0
3. WORRYING TOO MUCH ABOUT DIFFERENT THINGS: NOT AT ALL
2. NOT BEING ABLE TO STOP OR CONTROL WORRYING: NOT AT ALL

## 2021-07-20 ASSESSMENT — MIFFLIN-ST. JEOR: SCORE: 1821.33

## 2021-07-21 ENCOUNTER — MYC MEDICAL ADVICE (OUTPATIENT)
Dept: FAMILY MEDICINE | Facility: CLINIC | Age: 26
End: 2021-07-21

## 2021-07-21 ASSESSMENT — PATIENT HEALTH QUESTIONNAIRE - PHQ9: SUM OF ALL RESPONSES TO PHQ QUESTIONS 1-9: 0

## 2021-07-21 ASSESSMENT — ANXIETY QUESTIONNAIRES: GAD7 TOTAL SCORE: 0

## 2021-07-27 ENCOUNTER — ALLIED HEALTH/NURSE VISIT (OUTPATIENT)
Dept: ALLERGY | Facility: CLINIC | Age: 26
End: 2021-07-27
Payer: COMMERCIAL

## 2021-07-27 DIAGNOSIS — R09.82 ALLERGIC RHINITIS WITH POSTNASAL DRIP: ICD-10-CM

## 2021-07-27 DIAGNOSIS — H10.10 ALLERGIC RHINOCONJUNCTIVITIS: ICD-10-CM

## 2021-07-27 DIAGNOSIS — J30.9 ALLERGIC RHINITIS WITH POSTNASAL DRIP: ICD-10-CM

## 2021-07-27 DIAGNOSIS — Z51.6 NEED FOR DESENSITIZATION TO ALLERGENS: Primary | ICD-10-CM

## 2021-07-27 DIAGNOSIS — J30.9 ALLERGIC RHINOCONJUNCTIVITIS: ICD-10-CM

## 2021-07-27 PROCEDURE — 95125 IMMUNOTHERAPY 2/> INJECTIONS: CPT | Performed by: DERMATOLOGY

## 2021-07-27 PROCEDURE — 99207 PR NO CHARGE NURSE ONLY: CPT | Performed by: DERMATOLOGY

## 2021-07-27 NOTE — PROGRESS NOTES
Patient came in for 2 Allergy injection. Patient had no reaction to last shots. I gave 0.3 and 0.5 ml of the red bottle subcutaneous without any issues.   Patient instructed to wait in building for at least 30 min. after injections  Patients comes into clinic today at the request of  for allergy injections     No reaction to last injection.     This service provided today was under the direct supervision of  , who was available if needed.     Danielle Rudd RN

## 2021-08-06 ENCOUNTER — ALLIED HEALTH/NURSE VISIT (OUTPATIENT)
Dept: ALLERGY | Facility: CLINIC | Age: 26
End: 2021-08-06
Payer: OTHER MISCELLANEOUS

## 2021-08-06 DIAGNOSIS — Z51.6 NEED FOR DESENSITIZATION TO ALLERGENS: Primary | ICD-10-CM

## 2021-08-06 PROCEDURE — 99207 PR NO CHARGE NURSE ONLY: CPT | Performed by: DERMATOLOGY

## 2021-08-06 PROCEDURE — 95120 IMMUNOTHERAPY ONE INJECTION: CPT | Performed by: DERMATOLOGY

## 2021-08-06 NOTE — PROGRESS NOTES
Patient came in for 1 Allergy injection. Patient had no reaction to last shots. I gave 0.4 ml of the red bottle subcutaneous without any issues.   Patient instructed to wait in building for at least 30 min. after injections  Patients comes into clinic today at the request of Dr. Vides for allergy injections     No reaction to last injection.     This service provided today was under the direct supervision of Dr. Vides , who was available if needed.     Eugenia Gamino CMA

## 2021-08-10 ENCOUNTER — ALLIED HEALTH/NURSE VISIT (OUTPATIENT)
Dept: ALLERGY | Facility: CLINIC | Age: 26
End: 2021-08-10
Payer: COMMERCIAL

## 2021-08-10 DIAGNOSIS — Z51.6 NEED FOR DESENSITIZATION TO ALLERGENS: Primary | ICD-10-CM

## 2021-08-10 PROCEDURE — 99207 PR NO CHARGE LOS: CPT | Performed by: DERMATOLOGY

## 2021-08-10 PROCEDURE — 95125 IMMUNOTHERAPY 2/> INJECTIONS: CPT | Performed by: DERMATOLOGY

## 2021-08-10 NOTE — PROGRESS NOTES
Patient came in for 2 Allergy injection. Patient had no reaction to last shots. I gave 0.5 ml of the red bottle subcutaneous without any issues.   Patient instructed to wait in building for at least 30 min. after injections  Patients comes into clinic today at the request of Dr. Vides for allergy injections     No reaction to last injection.     This service provided today was under the direct supervision of Dr. Vides  , who was available if needed.     Macarena Pelaez, CMA

## 2021-08-12 DIAGNOSIS — J30.9 ALLERGIC RHINOCONJUNCTIVITIS: Primary | ICD-10-CM

## 2021-08-12 DIAGNOSIS — H10.10 ALLERGIC RHINOCONJUNCTIVITIS: Primary | ICD-10-CM

## 2021-08-12 PROCEDURE — 99207 PR ANTIGEN RX SERV,1-MORE ANTIGENS: CPT

## 2021-08-12 NOTE — PROGRESS NOTES
I was present during the mixing of the Allergy serums at the [facility] pharmacy.  Manish Vides MD

## 2021-09-07 ENCOUNTER — ALLIED HEALTH/NURSE VISIT (OUTPATIENT)
Dept: ALLERGY | Facility: CLINIC | Age: 26
End: 2021-09-07
Payer: COMMERCIAL

## 2021-09-07 DIAGNOSIS — J30.9 ALLERGIC RHINITIS WITH POSTNASAL DRIP: ICD-10-CM

## 2021-09-07 DIAGNOSIS — R09.82 ALLERGIC RHINITIS WITH POSTNASAL DRIP: ICD-10-CM

## 2021-09-07 DIAGNOSIS — Z51.6 NEED FOR DESENSITIZATION TO ALLERGENS: Primary | ICD-10-CM

## 2021-09-07 DIAGNOSIS — J30.9 ALLERGIC RHINOCONJUNCTIVITIS: ICD-10-CM

## 2021-09-07 DIAGNOSIS — H10.10 ALLERGIC RHINOCONJUNCTIVITIS: ICD-10-CM

## 2021-09-07 PROCEDURE — 99207 PR NO CHARGE LOS: CPT | Performed by: DERMATOLOGY

## 2021-09-07 PROCEDURE — 95125 IMMUNOTHERAPY 2/> INJECTIONS: CPT | Performed by: DERMATOLOGY

## 2021-09-09 DIAGNOSIS — H10.10 ALLERGIC RHINOCONJUNCTIVITIS: Primary | ICD-10-CM

## 2021-09-09 DIAGNOSIS — J30.9 ALLERGIC RHINOCONJUNCTIVITIS: Primary | ICD-10-CM

## 2021-09-09 DIAGNOSIS — R09.82 ALLERGIC RHINITIS WITH POSTNASAL DRIP: ICD-10-CM

## 2021-09-09 DIAGNOSIS — J30.9 ALLERGIC RHINITIS WITH POSTNASAL DRIP: ICD-10-CM

## 2021-09-09 PROCEDURE — 99207 PR ANTIGEN RX SERV,1-MORE ANTIGENS: CPT

## 2021-09-09 NOTE — PROGRESS NOTES
I was present during the mixing of the Allergy serums at the [facility] pharmacy.  Manish Vides MD

## 2021-10-12 ENCOUNTER — OFFICE VISIT (OUTPATIENT)
Dept: PODIATRY | Facility: CLINIC | Age: 26
End: 2021-10-12
Payer: COMMERCIAL

## 2021-10-12 VITALS — BODY MASS INDEX: 21.69 KG/M2 | HEART RATE: 67 BPM | WEIGHT: 169 LBS | HEIGHT: 74 IN | OXYGEN SATURATION: 99 %

## 2021-10-12 DIAGNOSIS — M72.2 PLANTAR FASCIITIS: Primary | ICD-10-CM

## 2021-10-12 PROCEDURE — 99203 OFFICE O/P NEW LOW 30 MIN: CPT | Performed by: PODIATRIST

## 2021-10-12 RX ORDER — TRETINOIN 0.25 MG/G
CREAM TOPICAL
COMMUNITY
Start: 2021-09-01

## 2021-10-12 ASSESSMENT — MIFFLIN-ST. JEOR: SCORE: 1821.33

## 2021-10-12 ASSESSMENT — PAIN SCALES - GENERAL: PAINLEVEL: NO PAIN (1)

## 2021-10-12 NOTE — LETTER
10/12/2021         RE: José Luis Palacios  [address]  [address]  [place] [address]        Dear Colleague,    Thank you for referring your patient, José Luis Palacios, to the [facility]. Please see a copy of my visit note below.    FOOT AND ANKLE SURGERY/PODIATRY CONSULT NOTE         ASSESSMENT:   Plantar fasciitis right foot      TREATMENT:  I have recommended prescription orthotics. The patient is to return to the clinic if his pain persist.        HPI: José Luis Palacios presented to the clinic today complaining of mild pain involving his right heel. The patient stated that approximate 1 year ago he went [place]. Upon his return he has significant pain in the bottom of his right heel. Over the past several months the pain has markedly improved. He denies any particular trauma to his heel. He has not had any associated redness or swelling. The pain is relieved with nonweightbearing. At this time he has no post static dyskinesia. He denies any other previous treatment other than OTC anti-inflammatories as needed.    Past Medical History:   Diagnosis Date     Acne      Hx of kidney disease     ureter issue, resolved       Social History     Socioeconomic History     Marital status: Single     Spouse name: Not on file     Number of children: Not on file     Years of education: Not on file     Highest education level: Not on file   Occupational History     Not on file   Tobacco Use     Smoking status: Never Smoker     Smokeless tobacco: Never Used   Substance and Sexual Activity     Alcohol use: Yes     Alcohol/week: 1.0 - 5.0 standard drinks     Types: 1 - 5 Standard drinks or equivalent per week     Drug use: Yes     Types: Marijuana     Comment: occasionally, once/month     Sexual activity: Never   Other Topics Concern     Parent/sibling w/ CABG, MI or angioplasty before 65F 55M? No   Social History Narrative     Not on file     Social  Determinants of Health     Financial Resource Strain:      Difficulty of Paying Living Expenses:    Food Insecurity:      Worried About Running Out of Food in the Last Year:      Ran Out of Food in the Last Year:    Transportation Needs:      Lack of Transportation (Medical):      Lack of Transportation (Non-Medical):    Physical Activity:      Days of Exercise per Week:      Minutes of Exercise per Session:    Stress:      Feeling of Stress :    Social Connections:      Frequency of Communication with Friends and Family:      Frequency of Social Gatherings with Friends and Family:      Attends Mu-ism Services:      Active Member of Clubs or Organizations:      Attends Club or Organization Meetings:      Marital Status:    Intimate Partner Violence:      Fear of Current or Ex-Partner:      Emotionally Abused:      Physically Abused:      Sexually Abused:         No Known Allergies       Current Outpatient Medications:      fexofenadine (ALLEGRA) 180 MG tablet, Take 1 tablet (180 mg) by mouth daily, Disp: 30 tablet, Rfl: 3     fluticasone (FLONASE) 50 MCG/ACT nasal spray, Spray 1 spray into both nostrils daily, Disp: 16 g, Rfl: 1     ORDER FOR ALLERGEN IMMUNOTHERAPY, Name of Mix: [facility] standardized extract [facility] 50% D. Farinae and 50% D. Pteronyssinus Stock solution (red) 10,000AU  5ml  Please provide as well following dilutions (5ml each) 1) red = 1:1  Diluent: HSA, Disp: 5 mL, Rfl: PRN     ORDER FOR ALLERGEN IMMUNOTHERAPY, Name of Mix: ALK 9 tree mix center Al alum ([place], White Nic, Black Birch, American Elm, ShagbarkHickory, Maple (Sugar), [place], White [place], AmericanSycamore) Stock solution (red) 10,000AU  5ml  Please provide as well following dilutions (5ml each) 1) red = 1:1  Diluent: HSA, Disp: 5 mL, Rfl: PRN     predniSONE (DELTASONE) 50 MG tablet, Emergency set: if severe allergic reaction take immediately 100mg Prednisone (2x50mg) and 2 Tabl Allegra 180mg., Disp: 2 tablet, Rfl: 3     tretinoin  (RETIN-A) 0.025 % external cream, , Disp: , Rfl:      Family History   Problem Relation Age of Onset     Hypertension Father      Mitral valve prolapse Father         regurg     Skin Cancer Mother         sun related     No Known Problems Sister      No Known Problems Maternal Grandmother      Hypertension Maternal Grandfather      Breast Cancer Paternal Grandmother      Coronary Artery Disease Paternal Grandfather         heart attack     Suicide Maternal Uncle      Glaucoma No family hx of      Macular Degeneration No family hx of         Social History     Socioeconomic History     Marital status: Single     Spouse name: Not on file     Number of children: Not on file     Years of education: Not on file     Highest education level: Not on file   Occupational History     Not on file   Tobacco Use     Smoking status: Never Smoker     Smokeless tobacco: Never Used   Substance and Sexual Activity     Alcohol use: Yes     Alcohol/week: 1.0 - 5.0 standard drinks     Types: 1 - 5 Standard drinks or equivalent per week     Drug use: Yes     Types: Marijuana     Comment: occasionally, once/month     Sexual activity: Never   Other Topics Concern     Parent/sibling w/ CABG, MI or angioplasty before 65F 55M? No   Social History Narrative     Not on file     Social Determinants of Health     Financial Resource Strain:      Difficulty of Paying Living Expenses:    Food Insecurity:      Worried About Running Out of Food in the Last Year:      Ran Out of Food in the Last Year:    Transportation Needs:      Lack of Transportation (Medical):      Lack of Transportation (Non-Medical):    Physical Activity:      Days of Exercise per Week:      Minutes of Exercise per Session:    Stress:      Feeling of Stress :    Social Connections:      Frequency of Communication with Friends and Family:      Frequency of Social Gatherings with Friends and Family:      Attends Rastafarian Services:      Active Member of Clubs or Organizations:       "Attends Club or Organization Meetings:      Marital Status:    Intimate Partner Violence:      Fear of Current or Ex-Partner:      Emotionally Abused:      Physically Abused:      Sexually Abused:         Review of Systems - Patient denies fever, chills, rash, wound, stiffness, limping, numbness, weakness, heart burn, blood in stool, chest pain with activity, calf pain when walking, shortness of breath with activity, chronic cough, easy bleeding/bruising, swelling of ankles, excessive thirst, fatigue, depression, anxiety.  Patient admits to mild heel pain right foot.      OBJECTIVE:  Appearance: alert, well appearing, and in no distress.    Pulse 67   Ht 1.88 m (6' 2\")   Wt 76.7 kg (169 lb)   SpO2 99%   BMI 21.70 kg/m       Body mass index is 21.7 kg/m .     General appearance: Patient is alert and fully cooperative with history & exam.  No sign of distress is noted during the visit.  Psychiatric: Affect is pleasant & appropriate.  Patient appears motivated to improve health.  Respiratory: Breathing is regular & unlabored while sitting.  HEENT: Hearing is intact to spoken word.  Speech is clear.  No gross evidence of visual impairment that would impact ambulation.    Vascular: Dorsalis pedis and posterior tibial pulses are palpable. There is pedal hair growth bilaterally.  CFT < 3 sec from anterior tibial surface to distal digits bilaterally. There is no appreciable edema noted.  Dermatologic: Turgor and texture are within normal limits. No coloration or temperature changes. No primary or secondary lesions noted.  Neurologic: All epicritic and proprioceptive sensations are grossly intact bilaterally.  Musculoskeletal: All active and passive ankle, subtalar, midtarsal, and 1st MPJ range of motion are grossly intact without pain or crepitus, with the exception of none. Manual muscle strength is within normal limits bilaterally. All dorsiflexors, plantarflexors, invertors, evertors are intact bilaterally. Minimal " tenderness present to the right heel on palpation. No tenderness to the right foot or ankle with range of motion. Calf is soft/non-tender without warmth/induration    Imaging:       No images are attached to the encounter or orders placed in the encounter.     No results found.   No results found.       Jose Krueger DPM  [facility] Foot & Ankle Surgery/Podiatry         Again, thank you for allowing me to participate in the care of your patient.        Sincerely,        Jose Carlin DPM

## 2021-10-12 NOTE — PATIENT INSTRUCTIONS
What are Prescription Custom Orthotics?  Custom orthotics are specially-made devices designed to support and comfort your feet. Prescription orthotics are crafted for you and no one else. They match the contours of your feet precisely and are designed for the way you move. Orthotics are only manufactured after a podiatrist has conducted a complete evaluation of your feet, ankles, and legs, so the orthotic can accommodate your unique foot structure and pathology.  Prescription orthotics are divided into two categories:    Functional orthotics are designed to control abnormal motion. They may be used to treat foot pain caused by abnormal motion; they can also be used to treat injuries such as shin splints or tendinitis. Functional orthotics are usually crafted of a semi-rigid material such as plastic or graphite.    Accommodative orthotics are softer and meant to provide additional cushioning and support. They can be used to treat diabetic foot ulcers, painful calluses on the bottom of the foot, and other uncomfortable conditions.  Podiatrists use orthotics to treat foot problems such as plantar fasciitis, bursitis, tendinitis, diabetic foot ulcers, and foot, ankle, and heel pain. Clinical research studies have shown that podiatrist-prescribed foot orthotics decrease foot pain and improve function.  Orthotics typically cost more than shoe inserts purchased in a retail store, but the additional cost is usually well worth it. Unlike shoe inserts, orthotics are molded to fit each individual foot, so you can be sure that your orthotics fit and do what they're supposed to do. Prescription orthotics are also made of top-notch materials and last many years when cared for properly. Insurance often helps pay for prescription orthotics.  What are Shoe Inserts?   You've seen them at the grocery store and at the mall. You've probably even seen them on TV and online. Shoe inserts are any kind of non-prescription foot support  designed to be worn inside a shoe. Pre-packaged, mass produced, arch supports are shoe inserts. So are the  custom-made  insoles and foot supports that you can order online or at retail stores. Unless the device has been prescribed by a doctor and crafted for your specific foot, it's a shoe insert, not a custom orthotic device--despite what the ads might say.  Shoe inserts can be very helpful for a variety of foot ailments, including flat arches and foot and leg pain. They can cushion your feet, provide comfort, and support your arches, but they can't correct biomechanical foot problems or cure long-standing foot issues.  The most common types of shoe inserts are:    Arch supports: Some people have high arches. Others have low arches or flat feet. Arch supports generally have a  bumped-up  appearance and are designed to support the foot's natural arch.     Insoles: Insoles slip into your shoe to provide extra cushioning and support. Insoles are often made of gel, foam, or plastic.     Heel liners: Heel liners, sometimes called heel pads or heel cups, provide extra cushioning in the heel region. They may be especially useful for patients who have foot pain caused by age-related thinning of the heels' natural fat pads.     Foot cushions: Do your shoes rub against your heel or your toes? Foot cushions come in many different shapes and sizes and can be used as a barrier between you and your shoe.  Choosing an Over-the-Counter Shoe Insert  Selecting a shoe insert from the wide variety of devices on the market can be overwhelming. Here are some podiatrist-tested tips to help you find the insert that best meets your needs:    Consider your health. Do you have diabetes? Problems with circulation? An over-the-counter insert may not be your best bet. Diabetes and poor circulation increase your risk of foot ulcers and infections, so schedule an appointment with a podiatrist. He or she can help you select a solution that won't  cause additional health problems.     Think about the purpose. Are you planning to run a marathon, or do you just need a little arch support in your work shoes? Look for a product that fits your planned level of activity.     Bring your shoes. For the insert to be effective, it has to fit into your shoes. So bring your sneakers, dress shoes, or work boots--whatever you plan to wear with your insert. Look for an insert that will fit the contours of your shoe.     Try them on. If all possible, slip the insert into your shoe and try it out. Walk around a little. How does it feel? Don't assume that feelings of pressure will go away with continued wear. (If you can't try the inserts at the store, ask about the store's return policy and hold on to your receipt.)    Please call one of the [facility] locations below to schedule an appointment. If you received a prescription please bring it with you to your appointment. Some locations are limited to what they carry.    Office Locations    [facility]  [address] [place] [address]  Home Medical Equipment, [address]   Phone: [phone]   Orthotics and Prosthetics, [address]   Phone: [phone]    [facility]  Home Medical Equipment  [address], [place] [address]   Phone: [phone]    Orthotics and Prosthetics ([facility])    [address], [address], [place] [address]  Phone: [phone]    [facility]  [address]   [place] [address]   Phone: [phone]    [facility]  [address]  [place] [address]  Phone: [phone]    [facility] Bld[facility]   [address]  [place] [address]  Phone: [phone]    [facility]  [address]  [place] [address]  Phone: [phone]    [facility]  [address]  [place] [address]  Phone: [phone]    [address] [place] [address]   Phone: [phone]

## 2021-10-12 NOTE — PROGRESS NOTES
FOOT AND ANKLE SURGERY/PODIATRY CONSULT NOTE         ASSESSMENT:   Plantar fasciitis right foot      TREATMENT:  I have recommended prescription orthotics. The patient is to return to the clinic if his pain persist.        HPI: José Luis Palacios presented to the clinic today complaining of mild pain involving his right heel. The patient stated that approximate 1 year ago he went [place] a [place] west. Upon his return he has significant pain in the bottom of his right heel. Over the past several months the pain has markedly improved. He denies any particular trauma to his heel. He has not had any associated redness or swelling. The pain is relieved with nonweightbearing. At this time he has no post static dyskinesia. He denies any other previous treatment other than OTC anti-inflammatories as needed.    Past Medical History:   Diagnosis Date     Acne      Hx of kidney disease     ureter issue, resolved       Social History     Socioeconomic History     Marital status: Single     Spouse name: Not on file     Number of children: Not on file     Years of education: Not on file     Highest education level: Not on file   Occupational History     Not on file   Tobacco Use     Smoking status: Never Smoker     Smokeless tobacco: Never Used   Substance and Sexual Activity     Alcohol use: Yes     Alcohol/week: 1.0 - 5.0 standard drinks     Types: 1 - 5 Standard drinks or equivalent per week     Drug use: Yes     Types: Marijuana     Comment: occasionally, once/month     Sexual activity: Never   Other Topics Concern     Parent/sibling w/ CABG, MI or angioplasty before 65F 55M? No   Social History Narrative     Not on file     Social Determinants of Health     Financial Resource Strain:      Difficulty of Paying Living Expenses:    Food Insecurity:      Worried About Running Out of Food in the Last Year:      Ran Out of Food in the Last Year:    Transportation Needs:      Lack of Transportation (Medical):      Lack of  Transportation (Non-Medical):    Physical Activity:      Days of Exercise per Week:      Minutes of Exercise per Session:    Stress:      Feeling of Stress :    Social Connections:      Frequency of Communication with Friends and Family:      Frequency of Social Gatherings with Friends and Family:      Attends Judaism Services:      Active Member of Clubs or Organizations:      Attends Club or Organization Meetings:      Marital Status:    Intimate Partner Violence:      Fear of Current or Ex-Partner:      Emotionally Abused:      Physically Abused:      Sexually Abused:         No Known Allergies       Current Outpatient Medications:      fexofenadine (ALLEGRA) 180 MG tablet, Take 1 tablet (180 mg) by mouth daily, Disp: 30 tablet, Rfl: 3     fluticasone (FLONASE) 50 MCG/ACT nasal spray, Spray 1 spray into both nostrils daily, Disp: 16 g, Rfl: 1     ORDER FOR ALLERGEN IMMUNOTHERAPY, Name of Mix: [facility] standardized extract [facility] 50% D. Farinae and 50% D. Pteronyssinus Stock solution (red) 10,000AU  5ml  Please provide as well following dilutions (5ml each) 1) red = 1:1  Diluent: HSA, Disp: 5 mL, Rfl: PRN     ORDER FOR ALLERGEN IMMUNOTHERAPY, Name of Mix: ALK 9 tree mix center Al alum ([place], White Nic, Black Birch, American Elm, ShagbarkHickory, Maple (Sugar), [place], [place], AmericanSycamore) Stock solution (red) 10,000AU  5ml  Please provide as well following dilutions (5ml each) 1) red = 1:1  Diluent: HSA, Disp: 5 mL, Rfl: PRN     predniSONE (DELTASONE) 50 MG tablet, Emergency set: if severe allergic reaction take immediately 100mg Prednisone (2x50mg) and 2 Tabl Allegra 180mg., Disp: 2 tablet, Rfl: 3     tretinoin (RETIN-A) 0.025 % external cream, , Disp: , Rfl:      Family History   Problem Relation Age of Onset     Hypertension Father      Mitral valve prolapse Father         regurg     Skin Cancer Mother         sun related     No Known Problems Sister      No Known Problems Maternal Grandmother       Hypertension Maternal Grandfather      Breast Cancer Paternal Grandmother      Coronary Artery Disease Paternal Grandfather         heart attack     Suicide Maternal Uncle      Glaucoma No family hx of      Macular Degeneration No family hx of         Social History     Socioeconomic History     Marital status: Single     Spouse name: Not on file     Number of children: Not on file     Years of education: Not on file     Highest education level: Not on file   Occupational History     Not on file   Tobacco Use     Smoking status: Never Smoker     Smokeless tobacco: Never Used   Substance and Sexual Activity     Alcohol use: Yes     Alcohol/week: 1.0 - 5.0 standard drinks     Types: 1 - 5 Standard drinks or equivalent per week     Drug use: Yes     Types: Marijuana     Comment: occasionally, once/month     Sexual activity: Never   Other Topics Concern     Parent/sibling w/ CABG, MI or angioplasty before 65F 55M? No   Social History Narrative     Not on file     Social Determinants of Health     Financial Resource Strain:      Difficulty of Paying Living Expenses:    Food Insecurity:      Worried About Running Out of Food in the Last Year:      Ran Out of Food in the Last Year:    Transportation Needs:      Lack of Transportation (Medical):      Lack of Transportation (Non-Medical):    Physical Activity:      Days of Exercise per Week:      Minutes of Exercise per Session:    Stress:      Feeling of Stress :    Social Connections:      Frequency of Communication with Friends and Family:      Frequency of Social Gatherings with Friends and Family:      Attends Anglican Services:      Active Member of Clubs or Organizations:      Attends Club or Organization Meetings:      Marital Status:    Intimate Partner Violence:      Fear of Current or Ex-Partner:      Emotionally Abused:      Physically Abused:      Sexually Abused:         Review of Systems - Patient denies fever, chills, rash, wound, stiffness, limping,  "numbness, weakness, heart burn, blood in stool, chest pain with activity, calf pain when walking, shortness of breath with activity, chronic cough, easy bleeding/bruising, swelling of ankles, excessive thirst, fatigue, depression, anxiety.  Patient admits to mild heel pain right foot.      OBJECTIVE:  Appearance: alert, well appearing, and in no distress.    Pulse 67   Ht 1.88 m (6' 2\")   Wt 76.7 kg (169 lb)   SpO2 99%   BMI 21.70 kg/m       Body mass index is 21.7 kg/m .     General appearance: Patient is alert and fully cooperative with history & exam.  No sign of distress is noted during the visit.  Psychiatric: Affect is pleasant & appropriate.  Patient appears motivated to improve health.  Respiratory: Breathing is regular & unlabored while sitting.  HEENT: Hearing is intact to spoken word.  Speech is clear.  No gross evidence of visual impairment that would impact ambulation.    Vascular: Dorsalis pedis and posterior tibial pulses are palpable. There is pedal hair growth bilaterally.  CFT < 3 sec from anterior tibial surface to distal digits bilaterally. There is no appreciable edema noted.  Dermatologic: Turgor and texture are within normal limits. No coloration or temperature changes. No primary or secondary lesions noted.  Neurologic: All epicritic and proprioceptive sensations are grossly intact bilaterally.  Musculoskeletal: All active and passive ankle, subtalar, midtarsal, and 1st MPJ range of motion are grossly intact without pain or crepitus, with the exception of none. Manual muscle strength is within normal limits bilaterally. All dorsiflexors, plantarflexors, invertors, evertors are intact bilaterally. Minimal tenderness present to the right heel on palpation. No tenderness to the right foot or ankle with range of motion. Calf is soft/non-tender without warmth/induration    Imaging:       No images are attached to the encounter or orders placed in the encounter.     No results found.   No " results found.       Jose Krueger DPM  [facility] Foot & Ankle Surgery/Podiatry

## 2021-11-02 ENCOUNTER — ALLIED HEALTH/NURSE VISIT (OUTPATIENT)
Dept: ALLERGY | Facility: CLINIC | Age: 26
End: 2021-11-02
Payer: COMMERCIAL

## 2021-11-02 DIAGNOSIS — Z51.6 NEED FOR DESENSITIZATION TO ALLERGENS: ICD-10-CM

## 2021-11-02 DIAGNOSIS — H10.10 ALLERGIC RHINOCONJUNCTIVITIS: Primary | ICD-10-CM

## 2021-11-02 DIAGNOSIS — J30.9 ALLERGIC RHINOCONJUNCTIVITIS: Primary | ICD-10-CM

## 2021-11-02 PROCEDURE — 99207 PR NO CHARGE NURSE ONLY: CPT | Performed by: DERMATOLOGY

## 2021-11-02 PROCEDURE — 95125 IMMUNOTHERAPY 2/> INJECTIONS: CPT | Performed by: DERMATOLOGY

## 2021-11-09 ENCOUNTER — OFFICE VISIT (OUTPATIENT)
Dept: OPHTHALMOLOGY | Facility: CLINIC | Age: 26
End: 2021-11-09
Attending: ORTHOPAEDIC SURGERY
Payer: COMMERCIAL

## 2021-11-09 DIAGNOSIS — H50.30 INTERMITTENT EXOTROPIA, MONOCULAR: Primary | ICD-10-CM

## 2021-11-09 DIAGNOSIS — H53.10 SUBJECTIVE VISUAL DISTURBANCE: Primary | ICD-10-CM

## 2021-11-09 DIAGNOSIS — H53.10 SUBJECTIVE VISUAL DISTURBANCE: ICD-10-CM

## 2021-11-09 DIAGNOSIS — H50.112 MONOCULAR EXOTROPIA OF LEFT EYE: ICD-10-CM

## 2021-11-09 PROCEDURE — 92004 COMPRE OPH EXAM NEW PT 1/>: CPT | Mod: GC | Performed by: OPHTHALMOLOGY

## 2021-11-09 PROCEDURE — G0463 HOSPITAL OUTPT CLINIC VISIT: HCPCS | Mod: 25

## 2021-11-09 PROCEDURE — 92060 SENSORIMOTOR EXAMINATION: CPT | Performed by: OPHTHALMOLOGY

## 2021-11-09 ASSESSMENT — REFRACTION_WEARINGRX
OD_SPHERE: -1.00
OS_CYLINDER: SPHERE
OS_SPHERE: -1.25
OD_CYLINDER: SPHERE

## 2021-11-09 ASSESSMENT — SLIT LAMP EXAM - LIDS
COMMENTS: NORMAL
COMMENTS: NORMAL

## 2021-11-09 ASSESSMENT — EXTERNAL EXAM - RIGHT EYE: OD_EXAM: NORMAL

## 2021-11-09 ASSESSMENT — CUP TO DISC RATIO
OD_RATIO: 0.1
OS_RATIO: 0.1

## 2021-11-09 ASSESSMENT — TONOMETRY
OS_IOP_MMHG: 18
OD_IOP_MMHG: 20
IOP_METHOD: ICARE

## 2021-11-09 ASSESSMENT — EXTERNAL EXAM - LEFT EYE: OS_EXAM: NORMAL

## 2021-11-09 ASSESSMENT — VISUAL ACUITY
OS_SC: 20/20
METHOD: SNELLEN - LINEAR
OD_SC: 20/20

## 2021-11-09 ASSESSMENT — CONF VISUAL FIELD
OS_NORMAL: 1
OD_NORMAL: 1

## 2021-11-09 NOTE — PROGRESS NOTES
Assessment & Plan     José Luis Palacios is a 26 year old male with the following diagnoses:   1. Intermittent exotropia, monocular    2. Subjective visual disturbance    3. Monocular exotropia of left eye           HPI:    Giles is here today for eye misalignment He has a history of patching up until 2nd grade. He does not recall specifically if his eye was turned out or in as a child.  For the past year or two he has felt that his vision is weird. People have noticed that his eyes are not aligned straight. It is worse with extended periods of driving. He has also noticed it in photos. People have also commented on his eyes not being aligned.     No injury, no sudden changes. He notices it intermittently. No headaches.   No history of strabismus surgery. Or an other eye surgery.  He wears glasses, though has a small prescription, and mostly does not wear them .      Independent historians:  None     Review of outside testing:  None     My interpretation performed today of outside testing:  Not applicable    Review of outside clinical notes:  Not available     Past medical history:  PRN Allergy medications     Family history / social history:  No family history of eye misalignment.  No vision loss    Exam:  Visual acuity 20/20 both eyes.  Sensorimotor exam shows a left exotropia with improved control at near.  Anterior segment with peripheral Posterior subcapsular cataract (PSC) cataract RIGHT eye.      Tests ordered and interpreted today:  Sensorimotor     Discussion of management / interpretation with another provider:   None     Assessment/Plan:   He has an intermittent exotropia without diplopia.  He has a history of childhood strabismus status post patching.  He has not undergone strabismus surgery in the past.  With [place] 4 dot testing he alternately suppresses in the distance and he has fusion at near.  At this point, he is not too terribly concerned by the cosmetic appearance.  He was mostly  interested in establishing a baseline.  Recommend that he follow-up with a strabismus surgery if he thinks that it worsens.            Attending Physician Attestation:  Complete documentation of historical and exam elements from today's encounter can be found in the full encounter summary report (not reduplicated in this progress note).  I personally obtained the chief complaint(s) and history of present illness.  I confirmed and edited as necessary the review of systems, past medical/surgical history, family history, social history, and examination findings as documented by others; and I examined the patient myself.  I personally reviewed the relevant tests, images, and reports as documented above.  I formulated and edited as necessary the assessment and plan and discussed the findings and management plan with the patient and family. I personally reviewed the ophthalmic test(s) associated with this encounter, agree with the interpretation(s) as documented by the resident/fellow, and have edited the corresponding report(s) as necessary.  - Abilio Ugarte MD  Ophthalmology Resident PGY3  [facility]

## 2021-11-09 NOTE — NURSING NOTE
Chief Complaints and History of Present Illnesses   Patient presents with     Diplopia Evaluation     Chief Complaint(s) and History of Present Illness(es)     Diplopia Evaluation               Comments     José Luis Palacios is a 26 year old male who presents today for    History of amblyopia and strabismus in childhood. No history of previous surgery. Here for strab assessment.     Juan Pablo GASTON 8:30 AM November 9, 2021

## 2021-11-11 DIAGNOSIS — J30.9 ALLERGIC RHINOCONJUNCTIVITIS: ICD-10-CM

## 2021-11-11 DIAGNOSIS — Z51.6 NEED FOR DESENSITIZATION TO ALLERGENS: Primary | ICD-10-CM

## 2021-11-11 DIAGNOSIS — J30.9 ALLERGIC RHINITIS WITH POSTNASAL DRIP: ICD-10-CM

## 2021-11-11 DIAGNOSIS — Z91.09 HOUSE DUST MITE ALLERGY: ICD-10-CM

## 2021-11-11 DIAGNOSIS — R09.82 ALLERGIC RHINITIS WITH POSTNASAL DRIP: ICD-10-CM

## 2021-11-11 DIAGNOSIS — H10.10 ALLERGIC RHINOCONJUNCTIVITIS: ICD-10-CM

## 2021-11-18 ENCOUNTER — THERAPY VISIT (OUTPATIENT)
Dept: ALLERGY | Facility: CLINIC | Age: 26
End: 2021-11-18
Payer: COMMERCIAL

## 2021-11-18 DIAGNOSIS — J30.9 ALLERGIC RHINITIS WITH POSTNASAL DRIP: Primary | ICD-10-CM

## 2021-11-18 DIAGNOSIS — Z91.09 HOUSE DUST MITE ALLERGY: ICD-10-CM

## 2021-11-18 DIAGNOSIS — J30.9 ALLERGIC RHINOCONJUNCTIVITIS: ICD-10-CM

## 2021-11-18 DIAGNOSIS — H10.10 ALLERGIC RHINOCONJUNCTIVITIS: ICD-10-CM

## 2021-11-18 DIAGNOSIS — R09.82 ALLERGIC RHINITIS WITH POSTNASAL DRIP: Primary | ICD-10-CM

## 2021-11-18 PROCEDURE — 95165 ANTIGEN THERAPY SERVICES: CPT | Performed by: DERMATOLOGY

## 2021-11-18 NOTE — PROGRESS NOTES
Date of Immunotherapy mixing: Nov 18, 2021    Mix name: house dust mite mix    Dilution: red 1:1    Vial size: 5ml each dilution      Mixer name: Osiris Cabral CPhT    Units to bill: 10U     Provider attestation: Manish WIGGINS MD supervised the mixing of these extracts while on site

## 2021-11-30 ENCOUNTER — ALLIED HEALTH/NURSE VISIT (OUTPATIENT)
Dept: ALLERGY | Facility: CLINIC | Age: 26
End: 2021-11-30
Payer: COMMERCIAL

## 2021-11-30 DIAGNOSIS — Z51.6 NEED FOR DESENSITIZATION TO ALLERGENS: Primary | ICD-10-CM

## 2021-11-30 PROCEDURE — 95120 IMMUNOTHERAPY ONE INJECTION: CPT

## 2021-11-30 PROCEDURE — 99207 PR NO CHARGE NURSE ONLY: CPT

## 2021-11-30 NOTE — PROGRESS NOTES
Patient came in for 1 Allergy injection. Patient had no reaction to last shots. I gave 0.5 ml of the red bottle DM subcutaneous without any issues.   Patient instructed to wait in building for at least 30 min. after injections  Patients comes into clinic today at the request of Dr Vides for allergy injections     No reaction to last injection.     This service provided today was under the direct supervision of Dr Vides , who was available if needed.     Reese Rivera RN

## 2021-12-30 ENCOUNTER — TELEPHONE (OUTPATIENT)
Dept: ALLERGY | Facility: CLINIC | Age: 26
End: 2021-12-30
Payer: COMMERCIAL

## 2021-12-30 NOTE — TELEPHONE ENCOUNTER
I attemoted to reach Giles and inform him that we need to cancel his allergy injection appointment. However Giles's phone just kept ringing without a voicemail. I will send patient a message on [employer] informing that his appointment will be cancelled.      ADELINA Chinchilla

## 2022-01-07 ENCOUNTER — LAB REQUISITION (OUTPATIENT)
Dept: LAB | Facility: CLINIC | Age: 27
End: 2022-01-07

## 2022-01-07 LAB — SARS-COV-2 RNA RESP QL NAA+PROBE: POSITIVE

## 2022-01-07 PROCEDURE — U0003 INFECTIOUS AGENT DETECTION BY NUCLEIC ACID (DNA OR RNA); SEVERE ACUTE RESPIRATORY SYNDROME CORONAVIRUS 2 (SARS-COV-2) (CORONAVIRUS DISEASE [COVID-19]), AMPLIFIED PROBE TECHNIQUE, MAKING USE OF HIGH THROUGHPUT TECHNOLOGIES AS DESCRIBED BY CMS-2020-01-R: HCPCS | Performed by: INTERNAL MEDICINE

## 2022-02-01 ENCOUNTER — ALLIED HEALTH/NURSE VISIT (OUTPATIENT)
Dept: ALLERGY | Facility: CLINIC | Age: 27
End: 2022-02-01
Payer: COMMERCIAL

## 2022-02-01 DIAGNOSIS — Z51.6 NEED FOR DESENSITIZATION TO ALLERGENS: Primary | ICD-10-CM

## 2022-02-01 PROCEDURE — 95120 IMMUNOTHERAPY ONE INJECTION: CPT

## 2022-02-01 NOTE — PROGRESS NOTES
Patient came in for 1 Allergy injection. Patient had no reaction to last shots. I gave 0.35 ml of the red bottle subcutaneous without any issues.   Patient instructed to wait in building for at least 30 min. after injections  Patients comes into clinic today at the request of Dr. Vides for allergy injections     No reaction to last injection.     This service provided today was under the direct supervision of Dr. Vides , who was available if needed.     Eugenia Gamino CMA

## 2022-02-15 ENCOUNTER — ALLIED HEALTH/NURSE VISIT (OUTPATIENT)
Dept: ALLERGY | Facility: CLINIC | Age: 27
End: 2022-02-15
Payer: COMMERCIAL

## 2022-02-15 DIAGNOSIS — Z51.6 NEED FOR DESENSITIZATION TO ALLERGENS: Primary | ICD-10-CM

## 2022-02-15 PROCEDURE — 95125 IMMUNOTHERAPY 2/> INJECTIONS: CPT | Performed by: DERMATOLOGY

## 2022-02-15 PROCEDURE — 99207 PR NO CHARGE NURSE ONLY: CPT | Performed by: DERMATOLOGY

## 2022-02-15 ASSESSMENT — PAIN SCALES - GENERAL: PAINLEVEL: NO PAIN (0)

## 2022-02-15 NOTE — NURSING NOTE
Giles has been getting 2 immunotherapies for DM and Trees, we stopped in November the Trees due to the serums not being available. We continued with his DM only and he is now at Monthly Maintenance for his DM and got scaled back to 0.1 ml of Red top Tree per Dr Vides. Giles will come in and start building back up his Tree serum only and will hopefully get back to monthly maintenance doses for both immunotherapies ordered.    Reese Rivera, Paramedic

## 2022-02-15 NOTE — PROGRESS NOTES
Patient came in for 2 Allergy injections. Patient had no reaction to last shots. I gave 0.1 ml of the red bottle tree, 0.5 ml of the red bottle DM subcutaneous without any issues.   Patient instructed to wait in building for at least 30 min. after injections  Patients comes into clinic today at the request of Dr Vides for allergy injections     No reaction to last injection.     This service provided today was under the direct supervision of Dr Vides , who was available if needed.     Reese Rivera RN

## 2022-02-22 ENCOUNTER — ALLIED HEALTH/NURSE VISIT (OUTPATIENT)
Dept: ALLERGY | Facility: CLINIC | Age: 27
End: 2022-02-22
Payer: COMMERCIAL

## 2022-02-22 DIAGNOSIS — Z51.6 NEED FOR DESENSITIZATION TO ALLERGENS: Primary | ICD-10-CM

## 2022-02-22 PROCEDURE — 99207 PR NO CHARGE NURSE ONLY: CPT | Performed by: DERMATOLOGY

## 2022-02-22 PROCEDURE — 95120 IMMUNOTHERAPY ONE INJECTION: CPT | Performed by: DERMATOLOGY

## 2022-02-22 ASSESSMENT — PAIN SCALES - GENERAL: PAINLEVEL: NO PAIN (0)

## 2022-03-15 ENCOUNTER — ALLIED HEALTH/NURSE VISIT (OUTPATIENT)
Dept: ALLERGY | Facility: CLINIC | Age: 27
End: 2022-03-15
Payer: COMMERCIAL

## 2022-03-15 DIAGNOSIS — Z51.6 NEED FOR DESENSITIZATION TO ALLERGENS: Primary | ICD-10-CM

## 2022-03-15 PROCEDURE — 99207 PR NO CHARGE NURSE ONLY: CPT

## 2022-03-15 PROCEDURE — 95125 IMMUNOTHERAPY 2/> INJECTIONS: CPT

## 2022-03-15 NOTE — PROGRESS NOTES
Patient came in for 2 Allergy injection. Patient had no reaction to last shots. I gave 0.1 ml and 0.5 of the red bottle subcutaneous without any issues.   Patient instructed to wait in building for at least 30 min. after injections  Patients comes into clinic today at the request of Dr. Vides for allergy injections     No reaction to last injection.     This service provided today was under the direct supervision of Dr. Vides , who was available if needed.     Martha Kumar, CMA

## 2022-04-15 ENCOUNTER — ALLIED HEALTH/NURSE VISIT (OUTPATIENT)
Dept: ALLERGY | Facility: CLINIC | Age: 27
End: 2022-04-15
Payer: COMMERCIAL

## 2022-04-15 DIAGNOSIS — Z51.6 NEED FOR DESENSITIZATION TO ALLERGENS: Primary | ICD-10-CM

## 2022-04-15 PROCEDURE — 95125 IMMUNOTHERAPY 2/> INJECTIONS: CPT | Performed by: DERMATOLOGY

## 2022-04-15 NOTE — PROGRESS NOTES
Patient came in for 2 Allergy injection. Patient had no reaction to last shots. I gave 0.5 and 0.1 ml of the red bottle subcutaneous without any issues.   Patient instructed to wait in building for at least 30 min. after injections  Patients comes into clinic today at the request of Dr. Vides for allergy injections     No reaction to last injection.     This service provided today was under the direct supervision of Dr. Vides , who was available if needed.     Eugenia Gamino [facility]

## 2022-05-04 ENCOUNTER — LAB REQUISITION (OUTPATIENT)
Dept: LAB | Facility: CLINIC | Age: 27
End: 2022-05-04

## 2022-05-04 LAB — SARS-COV-2 RNA RESP QL NAA+PROBE: NEGATIVE

## 2022-05-04 PROCEDURE — U0005 INFEC AGEN DETEC AMPLI PROBE: HCPCS | Performed by: INTERNAL MEDICINE

## 2022-05-20 ENCOUNTER — ALLIED HEALTH/NURSE VISIT (OUTPATIENT)
Dept: ALLERGY | Facility: CLINIC | Age: 27
End: 2022-05-20
Payer: COMMERCIAL

## 2022-05-20 DIAGNOSIS — Z51.6 NEED FOR DESENSITIZATION TO ALLERGENS: Primary | ICD-10-CM

## 2022-05-20 PROCEDURE — 95125 IMMUNOTHERAPY 2/> INJECTIONS: CPT | Performed by: DERMATOLOGY

## 2022-05-20 PROCEDURE — 99207 PR NO CHARGE NURSE ONLY: CPT | Performed by: DERMATOLOGY

## 2022-05-20 NOTE — PROGRESS NOTES
Patient came in for 2 Allergy injection. Patient had no reaction to last shots. I gave 0.1 ml and 0.5 ml of the red bottle subcutaneous without any issues.   Patient instructed to wait in building for at least 30 min. after injections  Patients comes into clinic today at the request of Dr Vides for allergy injections     No reaction to last injection.     This service provided today was under the direct supervision of Dr Vides , who was available if needed.     Trish Black RN

## 2022-06-02 ENCOUNTER — THERAPY VISIT (OUTPATIENT)
Dept: ALLERGY | Facility: CLINIC | Age: 27
End: 2022-06-02
Payer: COMMERCIAL

## 2022-06-02 ENCOUNTER — LAB REQUISITION (OUTPATIENT)
Dept: LAB | Facility: CLINIC | Age: 27
End: 2022-06-02

## 2022-06-02 DIAGNOSIS — R09.82 ALLERGIC RHINITIS WITH POSTNASAL DRIP: ICD-10-CM

## 2022-06-02 DIAGNOSIS — J30.9 ALLERGIC RHINOCONJUNCTIVITIS: ICD-10-CM

## 2022-06-02 DIAGNOSIS — H10.10 ALLERGIC RHINOCONJUNCTIVITIS: ICD-10-CM

## 2022-06-02 DIAGNOSIS — Z51.6 NEED FOR DESENSITIZATION TO ALLERGENS: Primary | ICD-10-CM

## 2022-06-02 DIAGNOSIS — J30.9 ALLERGIC RHINITIS WITH POSTNASAL DRIP: ICD-10-CM

## 2022-06-02 LAB — SARS-COV-2 RNA RESP QL NAA+PROBE: NEGATIVE

## 2022-06-02 PROCEDURE — U0005 INFEC AGEN DETEC AMPLI PROBE: HCPCS | Performed by: INTERNAL MEDICINE

## 2022-06-02 NOTE — PROGRESS NOTES
Date of Immunotherapy mixing: Jun 2, 2022    Mix name: 9 tree mix    Dilution: red 1:1; yellow 1:10; blue 1:100; green 1:1,000; silver 1:10,000     Vial size: 5ml each dilution      Mixer name: Isi Kuo CPhT     Units to bill: 30U    Provider attestation: Manish WIGGINS MD supervised the mixing of these extracts while on site

## 2022-06-21 ENCOUNTER — ALLIED HEALTH/NURSE VISIT (OUTPATIENT)
Dept: ALLERGY | Facility: CLINIC | Age: 27
End: 2022-06-21
Payer: COMMERCIAL

## 2022-06-21 DIAGNOSIS — Z51.6 NEED FOR DESENSITIZATION TO ALLERGENS: Primary | ICD-10-CM

## 2022-06-21 PROCEDURE — 99207 PR NO CHARGE NURSE ONLY: CPT | Performed by: DERMATOLOGY

## 2022-06-21 PROCEDURE — 95125 IMMUNOTHERAPY 2/> INJECTIONS: CPT | Performed by: DERMATOLOGY

## 2022-06-21 ASSESSMENT — PAIN SCALES - GENERAL: PAINLEVEL: NO PAIN (0)

## 2022-06-21 NOTE — PROGRESS NOTES
Patient came in for 2 allergy injection(s). Patient had no reaction to last shots. I gave 0.5 ml of the red bottle DM, 0.2 ml of the red bottle Tree subcutaneous without any issues.   Patient instructed to wait in building for at least 30 min. after injections  Patient comes into clinic today at the request of Dr. Vides for allergy injections     This service provided today was under the direct supervision of Dr. Vides, who was available if needed.

## 2022-06-30 ENCOUNTER — TELEPHONE (OUTPATIENT)
Dept: DERMATOLOGY | Facility: CLINIC | Age: 27
End: 2022-06-30

## 2022-07-12 ASSESSMENT — ENCOUNTER SYMPTOMS
MYALGIAS: 0
HEMATURIA: 0
WEAKNESS: 0
COUGH: 0
ABDOMINAL PAIN: 0
SORE THROAT: 0
SHORTNESS OF BREATH: 0
FREQUENCY: 0
NERVOUS/ANXIOUS: 0
EYE PAIN: 0
FEVER: 0
HEMATOCHEZIA: 0
HEARTBURN: 0
HEADACHES: 0
NAUSEA: 0
PALPITATIONS: 0
PARESTHESIAS: 0
ARTHRALGIAS: 0
CHILLS: 0
DYSURIA: 0
JOINT SWELLING: 0
CONSTIPATION: 0
DIARRHEA: 0
DIZZINESS: 0

## 2022-07-13 ENCOUNTER — OFFICE VISIT (OUTPATIENT)
Dept: FAMILY MEDICINE | Facility: CLINIC | Age: 27
End: 2022-07-13
Payer: COMMERCIAL

## 2022-07-13 VITALS
WEIGHT: 176 LBS | OXYGEN SATURATION: 98 % | HEIGHT: 75 IN | DIASTOLIC BLOOD PRESSURE: 64 MMHG | HEART RATE: 76 BPM | BODY MASS INDEX: 21.88 KG/M2 | SYSTOLIC BLOOD PRESSURE: 122 MMHG | TEMPERATURE: 98.4 F

## 2022-07-13 DIAGNOSIS — Z00.00 ROUTINE GENERAL MEDICAL EXAMINATION AT A HEALTH CARE FACILITY: ICD-10-CM

## 2022-07-13 DIAGNOSIS — Z11.1 SCREENING EXAMINATION FOR PULMONARY TUBERCULOSIS: Primary | ICD-10-CM

## 2022-07-13 PROBLEM — R03.0 WHITE COAT SYNDROME WITHOUT HYPERTENSION: Status: RESOLVED | Noted: 2020-01-21 | Resolved: 2022-07-13

## 2022-07-13 PROCEDURE — 99395 PREV VISIT EST AGE 18-39: CPT | Performed by: NURSE PRACTITIONER

## 2022-07-13 PROCEDURE — 86481 TB AG RESPONSE T-CELL SUSP: CPT | Performed by: NURSE PRACTITIONER

## 2022-07-13 PROCEDURE — 36415 COLL VENOUS BLD VENIPUNCTURE: CPT | Performed by: NURSE PRACTITIONER

## 2022-07-13 RX ORDER — LORATADINE 10 MG/1
10 TABLET ORAL DAILY
COMMUNITY
End: 2024-04-15

## 2022-07-13 ASSESSMENT — ENCOUNTER SYMPTOMS
HEMATOCHEZIA: 0
PARESTHESIAS: 0
NERVOUS/ANXIOUS: 0
WEAKNESS: 0
DIZZINESS: 0
FEVER: 0
FREQUENCY: 0
DIARRHEA: 0
CHILLS: 0
PALPITATIONS: 0
HEARTBURN: 0
HEMATURIA: 0
COUGH: 0
DYSURIA: 0
SHORTNESS OF BREATH: 0
CONSTIPATION: 0
JOINT SWELLING: 0
HEADACHES: 0
NAUSEA: 0
SORE THROAT: 0
ARTHRALGIAS: 0
ABDOMINAL PAIN: 0
EYE PAIN: 0
MYALGIAS: 0

## 2022-07-13 NOTE — PROGRESS NOTES
SUBJECTIVE:   CC: José Luis Palacios is an 27 year old male who presents for preventative health visit. Would like to do yearly exam. 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   Still needs either second step of two step mantoux or quant gold today, chooses latter    {Patient has been advised of split billing requirements and indicates understanding: Yes  Healthy Habits:     Getting at least 3 servings of Calcium per day:  Yes    Bi-annual eye exam:  Yes    Dental care twice a year:  Yes    Sleep apnea or symptoms of sleep apnea:  None    Diet:  Regular (no restrictions)    Frequency of exercise:  6-7 days/week    Duration of exercise:  45-60 minutes    Taking medications regularly:  No    Barriers to taking medications:  None    Medication side effects:  Not applicable and None    PHQ-2 Total Score: 0    Additional concerns today:  Yes              Today's PHQ-2 Score:   PHQ-2 ( 1999 Pfizer) 7/12/2022   Q1: Little interest or pleasure in doing things 0   Q2: Feeling down, depressed or hopeless 0   PHQ-2 Score 0   PHQ-2 Total Score (12-17 Years)- Positive if 3 or more points; Administer PHQ-A if positive -   Q1: Little interest or pleasure in doing things Not at all   Q2: Feeling down, depressed or hopeless Not at all   PHQ-2 Score 0        Abuse: Current or Past(Physical, Sexual or Emotional)- No  Do you feel safe in your environment? Yes    Have you ever done Advance Care Planning? (For example, a Health Directive, POLST, or a discussion with a medical provider or your loved ones about your wishes): No, advance care planning information given to patient to review.  Advanced care planning was discussed at today's visit.    Social History     Tobacco Use  "    Smoking status: Never Smoker     Smokeless tobacco: Never Used   Substance Use Topics     Alcohol use: Yes     Alcohol/week: 1.0 - 5.0 standard drink     Types: 1 - 5 Standard drinks or equivalent per week     If you drink alcohol do you typically have >3 drinks per day or >7 drinks per week? No    Alcohol Use 7/12/2022   Prescreen: >3 drinks/day or >7 drinks/week? Yes   AUDIT SCORE  6       Reviewed orders with patient. Reviewed health maintenance and updated orders accordingly - Yes  Lab work is in process    Reviewed and updated as needed this visit by clinical staff   Tobacco  Allergies  Meds                Reviewed and updated as needed this visit by Provider                       Review of Systems   Constitutional: Negative for chills and fever.   HENT: Negative for congestion, ear pain, hearing loss and sore throat.    Eyes: Negative for pain and visual disturbance.   Respiratory: Negative for cough and shortness of breath.    Cardiovascular: Negative for chest pain, palpitations and peripheral edema.   Gastrointestinal: Negative for abdominal pain, constipation, diarrhea, heartburn, hematochezia and nausea.   Genitourinary: Negative for dysuria, frequency, genital sores, hematuria, impotence, penile discharge and urgency.   Musculoskeletal: Negative for arthralgias, joint swelling and myalgias.   Skin: Negative for rash.   Neurological: Negative for dizziness, weakness, headaches and paresthesias.   Psychiatric/Behavioral: Negative for mood changes. The patient is not nervous/anxious.          OBJECTIVE:   /64 (BP Location: Right arm, Patient Position: Sitting)   Pulse 76   Temp 98.4  F (36.9  C)   Ht 1.892 m (6' 2.5\")   Wt 79.8 kg (176 lb)   SpO2 98%   BMI 22.29 kg/m      Physical Exam  GENERAL: healthy, alert and no distress  EYES: Eyes grossly normal to inspection, PERRL and conjunctivae and sclerae normal  HENT: ear canals and TM's normal, nose and mouth without ulcers or " "lesions  NECK: no adenopathy, no asymmetry, masses, or scars and thyroid normal to palpation  RESP: lungs clear to auscultation - no rales, rhonchi or wheezes  CV: regular rate and rhythm, normal S1 S2, no S3 or S4, no murmur, click or rub, no peripheral edema and peripheral pulses strong  ABDOMEN: soft, nontender, no hepatosplenomegaly, no masses and bowel sounds normal  MS: no gross musculoskeletal defects noted, no edema  SKIN: no suspicious lesions or rashes  NEURO: Normal strength and tone, mentation intact and speech normal  PSYCH: mentation appears normal, affect normal/bright    Diagnostic Test Results:  Labs reviewed in Epic  Results for orders placed or performed in visit on 07/13/22 (from the past 24 hour(s))   Quantiferon-TB Gold Plus    Specimen: Peripheral Blood    Narrative    The following orders were created for panel order Quantiferon-TB Gold Plus.  Procedure                               Abnormality         Status                     ---------                               -----------         ------                     Quantiferon TB Gold Plus...[[id number]]                                                 Quantiferon TB Gold Plus...[[id number]]                                                 Quantiferon TB Gold Plus...[[id number]]                                                 Quantiferon TB Gold Plus...[[id number]]                                                   Please view results for these tests on the individual orders.       ASSESSMENT/PLAN:   (Z11.1) Screening examination for pulmonary tuberculosis  (primary encounter diagnosis)  Comment:   Plan: Quantiferon-TB Gold Plus            (Z00.00) Routine general medical examination at a health care facility  Comment:   Plan:         COUNSELING:   Educated on testicular cancer screening, seat belt use, exercise.      Estimated body mass index is 22.29 kg/m  as calculated from the following:    Height as of this encounter: 1.892 m (6' 2.5\").    " Weight as of this encounter: 79.8 kg (176 lb).         He reports that he has never smoked. He has never used smokeless tobacco.      Counseling Resources:  ATP IV Guidelines  Pooled Cohorts Equation Calculator  FRAX Risk Assessment  ICSI Preventive Guidelines  Dietary Guidelines for Americans, 2010  USDA's MyPlate  ASA Prophylaxis  Lung CA Screening    Erica Malhotra NP  [facility]

## 2022-07-14 LAB
GAMMA INTERFERON BACKGROUND BLD IA-ACNC: 0 IU/ML
M TB IFN-G BLD-IMP: NEGATIVE
M TB IFN-G CD4+ BCKGRND COR BLD-ACNC: 10 IU/ML
MITOGEN IGNF BCKGRD COR BLD-ACNC: 0 IU/ML
MITOGEN IGNF BCKGRD COR BLD-ACNC: 0 IU/ML
QUANTIFERON MITOGEN: 10 IU/ML
QUANTIFERON NIL TUBE: 0 IU/ML
QUANTIFERON TB1 TUBE: 0 IU/ML
QUANTIFERON TB2 TUBE: 0

## 2022-09-06 ENCOUNTER — ALLIED HEALTH/NURSE VISIT (OUTPATIENT)
Dept: ALLERGY | Facility: CLINIC | Age: 27
End: 2022-09-06
Payer: COMMERCIAL

## 2022-09-06 DIAGNOSIS — Z51.6 NEED FOR DESENSITIZATION TO ALLERGENS: Primary | ICD-10-CM

## 2022-09-06 PROCEDURE — 95125 IMMUNOTHERAPY 2/> INJECTIONS: CPT

## 2022-09-06 PROCEDURE — 99207 PR NO CHARGE NURSE ONLY: CPT

## 2022-09-06 ASSESSMENT — PAIN SCALES - GENERAL: PAINLEVEL: NO PAIN (0)

## 2022-09-06 NOTE — NURSING NOTE
Discussed with Giles his need for a follow up appointment with Dr Vides to see how his immunotherapy has been going and to re evaluate the need to keep going.    Reese Rivera, Paramedic

## 2022-09-06 NOTE — PROGRESS NOTES
Patient came in for 2 allergy injection(s). Patient had no reaction to last shots. I gave 0.1 ml of the red bottle Tree, 0.2 ml of the red bottle DM subcutaneous without any issues.   Patient instructed to wait in building for at least 30 min. after injections  Patient comes into clinic today at the request of Dr. Vides for allergy injections     This service provided today was under the direct supervision of Dr. Vides, who was available if needed.

## 2022-09-11 ENCOUNTER — HEALTH MAINTENANCE LETTER (OUTPATIENT)
Age: 27
End: 2022-09-11

## 2022-09-19 NOTE — PROGRESS NOTES
[facility] Dermato-allergology Note  Office visit  Encounter Date: Sep 20, 2022  ____________________________________________    CC: No chief complaint on file.      HPI:  (Sep 20, 2022)  Mr. José Luis Palacios is a(n) 27 year old male who presents today as a return patient for allergy consultation  - follow up of IT started in September 2020 with dust mite and tree mix extracts  - had in April 2021 to dust mite one time strong local reaction, but since then no problems with injections  - this spring only slight eye symptoms and used for about 2 weeks eye drops. No major problems with nose.  - takes daily zyrtec and Flonase every morning. About 6 days without Zyrtec and Flonase --> more nasal congestion  - otherwise feeling well in usual state of health    Physical exam:  General: In no acute distress, well-developed, well-nourished  Eyes: no conjunctivitis  ENT: no signs of rhinitis   Pulmonary: no wheezing or coughing  Skin:Focused examination of the skin on test sites was performed = see test results below    Earlier History and Allergy exams:  Last visit 05/26/20  Patient developed about 1 month ago bad conjunctivitis and less runny nose. However, patient is using Flonase and eye drops (vasoconstrictor). Took every morning Cetirizine and th    However, patient still has in the morning and night from October to April very disturbing postnasal drip and recurrent pharyngitis with tonsil swelling. With Flonase somehow less problems, but even with HDM reduction still problems and patient still pretty annoyed by symptoms.   In addition, symptoms in early spring from the early blooming trees (see the prick test results!). With pollens more conjunctivitis.  Less problems in early summer and summer, which indicates that the pollen problem is really more due to early blooming trees.    Discussed with patient options for Immunotherapy and his mother seems to have had 8 years of IT with decent  success.  Patient would be interested to start IT.    CC:   No chief complaint on file.      Previous History of Present Illness:  Mr. José Luis Palacios is a 27 year old male who presents as a referral from Self.    Since about 2 years mostly night and morning nasal inflammation, postnasal drip, less congestion, no conjunctivitis and no coughing/asthma    Patient has as well in April/Mai since 5-6 itchy eyes and some Rhinitis.    Patient was using 2-3 weeks Flonase = no change  Antihistamines not really taken and if taken no big difference and Ranitidine did not change as well     Patient has Halo Nevi    Past Medical History:   Patient Active Problem List   Diagnosis     Allergies     Past Medical History:   Diagnosis Date     Acne      Amblyopia      Hx of kidney disease     ureter issue, resolved     Strabismus        Allergy History:   No Known Allergies    Sister and mother has some reactions to cat and dogs. Patient not (grew up with pets)    Social History:  Patient is nurse in pediatric cardiovascular ICU     reports that he has never smoked. He has never used smokeless tobacco. He reports current alcohol use of about 1.0 - 5.0 standard drink of alcohol per week. He reports current drug use. Drug: Marijuana.      Family History:  Family History   Problem Relation Age of Onset     Hypertension Father      Mitral valve prolapse Father         regurg     Skin Cancer Mother         sun related     No Known Problems Sister      No Known Problems Maternal Grandmother      Hypertension Maternal Grandfather      Breast Cancer Paternal Grandmother      Coronary Artery Disease Paternal Grandfather         heart attack     Suicide Maternal Uncle      Glaucoma No family hx of      Macular Degeneration No family hx of        Medications:  Current Outpatient Medications   Medication Sig Dispense Refill     fluticasone (FLONASE) 50 MCG/ACT nasal spray Spray 1 spray into both nostrils daily 16 g 1     loratadine  (CLARITIN) 10 MG tablet Take 10 mg by mouth daily       Multiple Vitamin (MULTI VITAMIN DAILY PO) Take 1 tablet by mouth daily       ORDER FOR ALLERGEN IMMUNOTHERAPY Name of Mix: tree mix; Ag #1/ Name Allergen: 9 Tree Mix Center [facility] Al/ : ALK/ Concentration of Extract: 10,000 PNU/ml / Total Antigen Volume: 5ml. 5 mL PRN     ORDER FOR ALLERGEN IMMUNOTHERAPY Name of Mix: dust mite mix; Ag #1/ Name Allergen: Std Mite, Mixed/ : ALK/ Concentration of Extract: 10,000 Au/ml / Total Antigen Volume: 5ml. 5 mL PRN     ORDER FOR ALLERGEN IMMUNOTHERAPY Name of Mix: [facility] STMM standardized extract STMM  50% D. Farinae and 50% D. Pteronyssinus  Stock solution (red) 10,000AU  5ml    Please provide as well following dilutions (5ml each)  1) red = 1:1    Diluent: HSA 5 mL PRN     ORDER FOR ALLERGEN IMMUNOTHERAPY Name of Mix: [facility] 9 tree mix center Al alum ([place], White Nic, Black Birch, American Elm, Shagbark  Hickory, Maple (Sugar), White Oak, White [place], American  [place])    Stock solution (red) 10,000AU  5ml    Please provide as well following dilutions (5ml each)  1) red = 1:1    Diluent: HSA 5 mL PRN     predniSONE (DELTASONE) 50 MG tablet Emergency set: if severe allergic reaction take immediately 100mg Prednisone (2x50mg) and 2 Tabl Allegra 180mg. 2 tablet 3     tretinoin (RETIN-A) 0.025 % external cream          Allergy Tests:    Atopy Screen (Placed 02/26/20 )    No Substance Readings (15 min) Evaluation   POS Histamine 1mg/ml ++    NEG NaCl 0.9% -      No Substance Readings (15 min) Evaluation   1 Alternaria alternata (tenuis)  -    2 Cladosporium herbarum -    3 Aspergillus fumigatus -    4 Penicillium notatum -    5 Dermatophagoides pteronyssinus -    6 Dermatophagoides farinae -    7 Dog epithelium (canis spp) -    8 Cat hair (isa catus) -    9 Cockroach   (Blatella americana & germanica) -    10 Grass mix midwest   (Sushila, Orchard, Redtop, Toney) ++    11 Miguel grass (sorghum  halepense) (+)    12 [place] mix   (common Cocklebur, Lamb s quarters, rough redroot Pigweed, Dock/Sorrel) -    13 Mug wort (artemisia vulgare) -    14 Ragweed giant/short (ambrosia spp) +    15 English Plantain (plantago lanceolata) +    16 Tree mix 1 (Pecan, Maple BHR, Oak RVW, american [place], black [place]) ++    17 Red cedar (juniperus virginia) -    18 Tree mix 2   (white Nic, river/red Birch, black [place], common [place], american Elm) +++    19 Box elder/Maple mix (acer spp) ++    20 Hickory shagbark (carya ovata) ++       -      Conclusion: mostly tree and grass pollen sensitization, where the tree pollen allergy is probably clinically relevant. Less the grass pollens    Intradermal Testing (Placed 02/26/20 )     No Substance Conc.  Readings (15min) Evaluation   1 NaCl  0.9% -     2 Histamine (prick) 0.1mg / ml ++     4 Standard Dust Mite - D. Farinae 1:10 ++ 10mmP/25mmE   5 Standard Dust Mite - D. Pteronyssinus 1:10 + 5mmP/8mmE   10 Aspergillus fumigatus  1:10 -     11 Penicillium notatum 1:10 -        Conclusion: strong sensitization to D. Farinae, less to D. Pteronyssinus and no immediate reaction to molds    Atopy Screen (Placed Sep 20, 2022)  No Substance Readings (15 min) Evaluation   POS Histamine 1mg/ml ++    NEG NaCl 0.9% -      No Substance Readings (15 min) Evaluation   1 Alternaria alternata (tenuis)  -    2 Cladosporium herbarum -    3 Aspergillus fumigatus -    4 Penicillium notatum -    5 Dermatophagoides pteronyssinus -    6 Dermatophagoides farinae -    7 Dog epithelium (canis spp) -    8 Cat hair (isa catus) -    9 Cockroach   (Blatella americana & germanica) ++    10 Grass mix midwest   (Sushila, Orchard, Redtop, Toney) ++    11 Miguel grass (sorghum halepense) ++    12 Weed mix   (common Cocklebur, Lamb s quarters, rough redroot Pigweed, Dock/Sorrel) (+)    13 Mug wort (artemisia vulgare) +    14 Ragweed giant/short (ambrosia spp) +    15 White birch (Betula papyrifera) ++    16 Tree  mix 1 (Pecan, Maple BHR, Oak RVW, american [place], black [place]) +/++    17 Red cedar (juniperus virginia) -    18 Tree mix 2   (white Nic, river/red Birch, black [place], common [place], american Elm) -    19 Box elder/Maple mix (acer spp) -    20 [place] shagbark (carya ovata) ++           Conclusion: some reduction of reactivity to tree pollens and as expected prick tests negatives to dust mites (however, cockroach positive). The grass pollen allergy still strong like 2 years ago, but still clinically not very relevant       Assessment & Plan:    ==> Final Diagnosis:     # perennial Rhinitis and postnasal drip mostly night/morning  * chronic illness with exacerbation, progression, side effects from treatment    In intradermal tests immediate type sensitization to house dust mites (not molds)  --> reduce house dust mites = info given    >> seasonal Rhinoconjunctivitis in spring with sensitization to tree pollens    Probably clinically less relevant to grass pollens  * chronic illness with exacerbation, progression, side effects from treatment    These conclusions are made at the best of one's knowledge and belief based on the provided evidence such as patient's history and allergy test results and they can change over time or can be incomplete because of missing information's.    ==> Treatment Plan:    >> continue Immunotherapy for following extracts:  - house dust mites ALK standardized 50% D. F. And 50% D.p.STMM 10,000 AU right arm  - tree pollen mix left arm center Al 9 tree mix ([place], White Nic, Black Birch, American Elm, Shagbark, Hickory, Maple (Sugar), White Oak, White [place], American [place])    >> maybe take Cetirizine 10mg every evening and try to stop Flonase    Patient counseling:  About IT, possible side effects (Urticaria, Anaphylaxis, Asthma, local reactions) and that patient has to wait after each shot 30min and that first 4-5 months weekly and then monthly. Explained as well emergency set  (without the Epipen!) and that it should be with patient the day of IT (given handout in wrap up).    Procedures Performed: Allergy tests, including prick tests    Staff: : provider    Follow-up in Derm-Allergy clinic for IT ==> in about 1 year re-evaluation IT  ___________________________    I spent a total of 25 minutes with José Luis Palacios during today s  visit. This time was spent discussing all the individual test results, correlating them to the clinical relevance, counseling the patient and/or coordinating care and performing allergy tests or procedures.

## 2022-09-20 ENCOUNTER — OFFICE VISIT (OUTPATIENT)
Dept: ALLERGY | Facility: CLINIC | Age: 27
End: 2022-09-20
Payer: COMMERCIAL

## 2022-09-20 DIAGNOSIS — R09.82 ALLERGIC RHINITIS WITH POSTNASAL DRIP: ICD-10-CM

## 2022-09-20 DIAGNOSIS — J30.9 ALLERGIC RHINITIS WITH POSTNASAL DRIP: ICD-10-CM

## 2022-09-20 DIAGNOSIS — Z51.6 NEED FOR DESENSITIZATION TO ALLERGENS: Primary | ICD-10-CM

## 2022-09-20 DIAGNOSIS — H10.10 ALLERGIC RHINOCONJUNCTIVITIS: ICD-10-CM

## 2022-09-20 DIAGNOSIS — J30.9 ALLERGIC RHINOCONJUNCTIVITIS: ICD-10-CM

## 2022-09-20 PROCEDURE — 95004 PERQ TESTS W/ALRGNC XTRCS: CPT | Performed by: DERMATOLOGY

## 2022-09-20 PROCEDURE — 99213 OFFICE O/P EST LOW 20 MIN: CPT | Mod: 25 | Performed by: DERMATOLOGY

## 2022-09-20 PROCEDURE — 95125 IMMUNOTHERAPY 2/> INJECTIONS: CPT | Performed by: DERMATOLOGY

## 2022-09-20 NOTE — NURSING NOTE
Dermatology Rooming Note    José Luis Palacios's goals for this visit include:   Chief Complaint   Patient presents with     Allergy Recheck     Discuss IT     Martha Kumar CMA

## 2022-09-20 NOTE — LETTER
9/20/2022         RE: José Luis Palacios  [address]  [address]  [facility] [address]        Dear Colleague,    Thank you for referring your patient, José Luis Palacios, to the [facility] [place]. Please see a copy of my visit note below.    [facility] Dermato-allergology Note  Office visit  Encounter Date: Sep 20, 2022  ____________________________________________    CC: No chief complaint on file.      HPI:  (Sep 20, 2022)  Mr. José Luis Palacios is a(n) 27 year old male who presents today as a return patient for allergy consultation  - follow up of IT started in September 2020 with dust mite and tree mix extracts  - had in April 2021 to dust mite one time strong local reaction, but since then no problems with injections  - this spring only slight eye symptoms and used for about 2 weeks eye drops. No major problems with nose.  - takes daily zyrtec and Flonase every morning. About 6 days without Zyrtec and Flonase --> more nasal congestion  - otherwise feeling well in usual state of health    Physical exam:  General: In no acute distress, well-developed, well-nourished  Eyes: no conjunctivitis  ENT: no signs of rhinitis   Pulmonary: no wheezing or coughing  Skin:Focused examination of the skin on test sites was performed = see test results below    Earlier History and Allergy exams:  Last visit 05/26/20  Patient developed about 1 month ago bad conjunctivitis and less runny nose. However, patient is using Flonase and eye drops (vasoconstrictor). Took every morning Cetirizine and th    However, patient still has in the morning and night from October to April very disturbing postnasal drip and recurrent pharyngitis with tonsil swelling. With Flonase somehow less problems, but even with HDM reduction still problems and patient still pretty annoyed by symptoms.   In addition, symptoms in early spring from the early blooming trees (see the prick test  results!). With pollens more conjunctivitis.  Less problems in early summer and summer, which indicates that the pollen problem is really more due to early blooming trees.    Discussed with patient options for Immunotherapy and his mother seems to have had 8 years of IT with decent success.  Patient would be interested to start IT.    CC:   No chief complaint on file.      Previous History of Present Illness:  Mr. José Luis Palacios is a 27 year old male who presents as a referral from Self.    Since about 2 years mostly night and morning nasal inflammation, postnasal drip, less congestion, no conjunctivitis and no coughing/asthma    Patient has as well in April/Mai since 5-6 itchy eyes and some Rhinitis.    Patient was using 2-3 weeks Flonase = no change  Antihistamines not really taken and if taken no big difference and Ranitidine did not change as well     Patient has Halo Nevi    Past Medical History:   Patient Active Problem List   Diagnosis     Allergies     Past Medical History:   Diagnosis Date     Acne      Amblyopia      Hx of kidney disease     ureter issue, resolved     Strabismus        Allergy History:   No Known Allergies    Sister and mother has some reactions to cat and dogs. Patient not (grew up with pets)    Social History:  Patient is nurse in pediatric cardiovascular ICU     reports that he has never smoked. He has never used smokeless tobacco. He reports current alcohol use of about 1.0 - 5.0 standard drink of alcohol per week. He reports current drug use. Drug: Marijuana.      Family History:  Family History   Problem Relation Age of Onset     Hypertension Father      Mitral valve prolapse Father         regurg     Skin Cancer Mother         sun related     No Known Problems Sister      No Known Problems Maternal Grandmother      Hypertension Maternal Grandfather      Breast Cancer Paternal Grandmother      Coronary Artery Disease Paternal Grandfather         heart attack     Suicide  Maternal Uncle      Glaucoma No family hx of      Macular Degeneration No family hx of        Medications:  Current Outpatient Medications   Medication Sig Dispense Refill     fluticasone (FLONASE) 50 MCG/ACT nasal spray Spray 1 spray into both nostrils daily 16 g 1     loratadine (CLARITIN) 10 MG tablet Take 10 mg by mouth daily       Multiple Vitamin (MULTI VITAMIN DAILY PO) Take 1 tablet by mouth daily       ORDER FOR ALLERGEN IMMUNOTHERAPY Name of Mix: tree mix; Ag #1/ Name Allergen: 9 Tree Mix Center ALK Center Al/ : ALK/ Concentration of Extract: 10,000 PNU/ml / Total Antigen Volume: 5ml. 5 mL PRN     ORDER FOR ALLERGEN IMMUNOTHERAPY Name of Mix: dust mite mix; Ag #1/ Name Allergen: Std Mite, Mixed/ : ALK/ Concentration of Extract: 10,000 Au/ml / Total Antigen Volume: 5ml. 5 mL PRN     ORDER FOR ALLERGEN IMMUNOTHERAPY Name of Mix: [facility] STMM standardized extract STMM  50% D. Farinae and 50% D. Pteronyssinus  Stock solution (red) 10,000AU  5ml    Please provide as well following dilutions (5ml each)  1) red = 1:1    Diluent: HSA 5 mL PRN     ORDER FOR ALLERGEN IMMUNOTHERAPY Name of Mix: ALK 9 tree mix center Al alum ([place], White Nic, Black Birch, American Elm, Shagbark  Hickory, Maple (Sugar), White Oak, White [place], American  [place])    Stock solution (red) 10,000AU  5ml    Please provide as well following dilutions (5ml each)  1) red = 1:1    Diluent: HSA 5 mL PRN     predniSONE (DELTASONE) 50 MG tablet Emergency set: if severe allergic reaction take immediately 100mg Prednisone (2x50mg) and 2 Tabl Allegra 180mg. 2 tablet 3     tretinoin (RETIN-A) 0.025 % external cream          Allergy Tests:    Atopy Screen (Placed 02/26/20 )    No Substance Readings (15 min) Evaluation   POS Histamine 1mg/ml ++    NEG NaCl 0.9% -      No Substance Readings (15 min) Evaluation   1 Alternaria alternata (tenuis)  -    2 Cladosporium herbarum -    3 Aspergillus fumigatus -    4 Penicillium notatum -     5 Dermatophagoides pteronyssinus -    6 Dermatophagoides farinae -    7 Dog epithelium (canis spp) -    8 Cat hair (isa catus) -    9 Cockroach   (Blatella americana & germanica) -    10 Grass mix midwest   (Sushila, Orchard, Redtop, Toney) ++    11 Miguel grass (sorghum halepense) (+)    12 [place] mix   (common Cocklebur, Lamb s quarters, rough redroot Pigweed, Dock/Sorrel) -    13 Mug wort (artemisia vulgare) -    14 Ragweed giant/short (ambrosia spp) +    15 English Plantain (plantago lanceolata) +    16 Tree mix 1 (Pecan, Maple BHR, Oak RVW, american [place], black [place]) ++    17 Red cedar (juniperus virginia) -    18 Tree mix 2   (white Nic, river/red Birch, black [place], common [place], american Elm) +++    19 Box elder/Maple mix (acer spp) ++    20 Hickory shagbark (carya ovata) ++       -      Conclusion: mostly tree and grass pollen sensitization, where the tree pollen allergy is probably clinically relevant. Less the grass pollens    Intradermal Testing (Placed 02/26/20 )     No Substance Conc.  Readings (15min) Evaluation   1 NaCl  0.9% -     2 Histamine (prick) 0.1mg / ml ++     4 Standard Dust Mite - D. Farinae 1:10 ++ 10mmP/25mmE   5 Standard Dust Mite - D. Pteronyssinus 1:10 + 5mmP/8mmE   10 Aspergillus fumigatus  1:10 -     11 Penicillium notatum 1:10 -        Conclusion: strong sensitization to D. Farinae, less to D. Pteronyssinus and no immediate reaction to molds    Atopy Screen (Placed Sep 20, 2022)  No Substance Readings (15 min) Evaluation   POS Histamine 1mg/ml ++    NEG NaCl 0.9% -      No Substance Readings (15 min) Evaluation   1 Alternaria alternata (tenuis)  -    2 Cladosporium herbarum -    3 Aspergillus fumigatus -    4 Penicillium notatum -    5 Dermatophagoides pteronyssinus -    6 Dermatophagoides farinae -    7 Dog epithelium (canis spp) -    8 Cat hair (isa catus) -    9 Cockroach   (Blatella americana & germanica) ++    10 [facility]   ([place], Orchard, Redtop,  Toney) ++    11 Miguel grass (sorghum halepense) ++    12 Weed mix   (common Cocklebur, Lamb s quarters, rough redroot Pigweed, Dock/Sorrel) (+)    13 Mug wort (artemisia vulgare) +    14 Ragweed giant/short (ambrosia spp) +    15 White birch (Betula papyrifera) ++    16 Tree mix 1 (Pecan, Maple BHR, Oak RVW, american [place], black [place]) +/++    17 Red cedar (juniperus virginia) -    18 Tree mix 2   (white Nic, river/red Birch, black [place], common [place], american Elm) -    19 Box elder/Maple mix (acer spp) -    20 [place] shagbark (carya ovata) ++           Conclusion: some reduction of reactivity to tree pollens and as expected prick tests negatives to dust mites (however, cockroach positive). The grass pollen allergy still strong like 2 years ago, but still clinically not very relevant       Assessment & Plan:    ==> Final Diagnosis:     # perennial Rhinitis and postnasal drip mostly night/morning  * chronic illness with exacerbation, progression, side effects from treatment    In intradermal tests immediate type sensitization to house dust mites (not molds)  --> reduce house dust mites = info given    >> seasonal Rhinoconjunctivitis in spring with sensitization to tree pollens    Probably clinically less relevant to grass pollens  * chronic illness with exacerbation, progression, side effects from treatment    These conclusions are made at the best of one's knowledge and belief based on the provided evidence such as patient's history and allergy test results and they can change over time or can be incomplete because of missing information's.    ==> Treatment Plan:    >> continue Immunotherapy for following extracts:  - house dust mites ALK standardized 50% D. F. And 50% D.p.STMM 10,000 AU right arm  - tree pollen mix left arm center Al 9 tree mix ([place], White Nic, Black Birch, American Elm, Shagbark, Hickory, Maple (Sugar), White Oak, White [place], American [place])    >> maybe take Cetirizine 10mg  every evening and try to stop Flonase    Patient counseling:  About IT, possible side effects (Urticaria, Anaphylaxis, Asthma, local reactions) and that patient has to wait after each shot 30min and that first 4-5 months weekly and then monthly. Explained as well emergency set (without the Epipen!) and that it should be with patient the day of IT (given handout in wrap up).    Procedures Performed: Allergy tests, including prick tests    Staff: : provider    Follow-up in Derm-Allergy clinic for IT ==> in about 1 year re-evaluation IT  ___________________________    I spent a total of 25 minutes with José Luis Palacios during today s  visit. This time was spent discussing all the individual test results, correlating them to the clinical relevance, counseling the patient and/or coordinating care and performing allergy tests or procedures.           Again, thank you for allowing me to participate in the care of your patient.        Sincerely,        Manish Vides MD

## 2022-09-21 ENCOUNTER — TELEPHONE (OUTPATIENT)
Dept: DERMATOLOGY | Facility: CLINIC | Age: 27
End: 2022-09-21

## 2022-09-21 NOTE — TELEPHONE ENCOUNTER
----- Message from Manish Vides MD sent at 9/20/2022  8:26 PM CDT -----  Regarding: immunotherapy order  Please fax to Manish the order  Thanks  PB

## 2022-09-26 ENCOUNTER — THERAPY VISIT (OUTPATIENT)
Dept: ALLERGY | Facility: CLINIC | Age: 27
End: 2022-09-26

## 2022-09-26 DIAGNOSIS — Z91.09 HOUSE DUST MITE ALLERGY: ICD-10-CM

## 2022-09-26 DIAGNOSIS — J30.9 ALLERGIC RHINOCONJUNCTIVITIS: ICD-10-CM

## 2022-09-26 DIAGNOSIS — Z51.6 NEED FOR DESENSITIZATION TO ALLERGENS: Primary | ICD-10-CM

## 2022-09-26 DIAGNOSIS — H10.10 ALLERGIC RHINOCONJUNCTIVITIS: ICD-10-CM

## 2022-09-27 ENCOUNTER — ALLIED HEALTH/NURSE VISIT (OUTPATIENT)
Dept: ALLERGY | Facility: CLINIC | Age: 27
End: 2022-09-27
Payer: COMMERCIAL

## 2022-09-27 DIAGNOSIS — Z51.6 NEED FOR DESENSITIZATION TO ALLERGENS: Primary | ICD-10-CM

## 2022-09-27 PROCEDURE — 99207 PR NO CHARGE NURSE ONLY: CPT | Performed by: DERMATOLOGY

## 2022-09-27 PROCEDURE — 95125 IMMUNOTHERAPY 2/> INJECTIONS: CPT | Performed by: DERMATOLOGY

## 2022-09-27 NOTE — PROGRESS NOTES
Patient came in for 2 allergy injection(s). Patient had no reaction to last shots. I gave 0.35 ml of the red bottle Tree, 0.5 ml of the red bottle DM subcutaneous without any issues.   Patient instructed to wait in building for at least 30 min. after injections  Patient comes into clinic today at the request of Dr. Vides for allergy injections     This service provided today was under the direct supervision of Dr. Vides, who was available if needed.

## 2022-09-27 NOTE — PROGRESS NOTES
Date of Immunotherapy mixing: Sep 26, 2022    Mix name: standardized dust mite mix ALK    Dilution: red 1:1     Vial size: 5ml each dilution      Mixer name: Osiris Cabral CPhT     Units to bill: 10U     Provider attestation: Manish WIGGINS MD supervised the mixing of these extracts while on site

## 2022-10-04 ENCOUNTER — ALLIED HEALTH/NURSE VISIT (OUTPATIENT)
Dept: ALLERGY | Facility: CLINIC | Age: 27
End: 2022-10-04
Payer: COMMERCIAL

## 2022-10-04 DIAGNOSIS — Z51.6 NEED FOR DESENSITIZATION TO ALLERGENS: Primary | ICD-10-CM

## 2022-10-04 PROCEDURE — 95120 IMMUNOTHERAPY ONE INJECTION: CPT

## 2022-10-04 PROCEDURE — 99207 PR NO CHARGE NURSE ONLY: CPT

## 2022-10-04 NOTE — PROGRESS NOTES
Patient came in for 1 allergy injection(s). Patient had no reaction to last shots. I gave 0.5 ml of the red bottle Tree subcutaneous without any issues.   Patient instructed to wait in building for at least 30 min. after injections  Patient comes into clinic today at the request of Dr. Vides for allergy injections     This service provided today was under the direct supervision of Dr. Vides, who was available if needed.

## 2022-11-01 ENCOUNTER — ALLIED HEALTH/NURSE VISIT (OUTPATIENT)
Dept: ALLERGY | Facility: CLINIC | Age: 27
End: 2022-11-01
Payer: COMMERCIAL

## 2022-11-01 DIAGNOSIS — Z51.6 NEED FOR DESENSITIZATION TO ALLERGENS: Primary | ICD-10-CM

## 2022-11-01 PROCEDURE — 99207 PR NO CHARGE NURSE ONLY: CPT | Performed by: DERMATOLOGY

## 2022-11-01 PROCEDURE — 95125 IMMUNOTHERAPY 2/> INJECTIONS: CPT | Performed by: DERMATOLOGY

## 2022-11-01 NOTE — PROGRESS NOTES
Patient came in for 2 allergy injection(s). Patient had no reaction to last shots. I gave 0.5 ml of the red bottle DM and 0.5 of the red bottle of Tree mix subcutaneous without any issues.   Patient instructed to wait in building for at least 30 min. after injections  Patient comes into clinic today at the request of Dr. Vides for allergy injections     This service provided today was under the direct supervision of Dr. Vides, who was available if needed.

## 2022-11-13 ASSESSMENT — ENCOUNTER SYMPTOMS
MYALGIAS: 0
SORE THROAT: 0
COUGH: 0
CONSTIPATION: 0
FEVER: 0
DIARRHEA: 0
EYE PAIN: 0
NERVOUS/ANXIOUS: 0
ARTHRALGIAS: 0
DYSURIA: 0
SHORTNESS OF BREATH: 0
WEAKNESS: 0
FREQUENCY: 0
PARESTHESIAS: 0
NAUSEA: 0
HEARTBURN: 0
PALPITATIONS: 0
HEADACHES: 0
HEMATURIA: 0
ABDOMINAL PAIN: 0
JOINT SWELLING: 0
DIZZINESS: 0
HEMATOCHEZIA: 0
CHILLS: 0

## 2022-11-14 ENCOUNTER — OFFICE VISIT (OUTPATIENT)
Dept: INTERNAL MEDICINE | Facility: CLINIC | Age: 27
End: 2022-11-14
Payer: COMMERCIAL

## 2022-11-14 VITALS
HEIGHT: 75 IN | WEIGHT: 140.3 LBS | HEART RATE: 91 BPM | SYSTOLIC BLOOD PRESSURE: 134 MMHG | BODY MASS INDEX: 17.44 KG/M2 | TEMPERATURE: 98.3 F | OXYGEN SATURATION: 99 % | DIASTOLIC BLOOD PRESSURE: 74 MMHG

## 2022-11-14 DIAGNOSIS — Z11.4 ENCOUNTER FOR SCREENING FOR HIV: ICD-10-CM

## 2022-11-14 DIAGNOSIS — Z11.59 ENCOUNTER FOR HEPATITIS C SCREENING TEST FOR LOW RISK PATIENT: ICD-10-CM

## 2022-11-14 DIAGNOSIS — Z00.00 ENCOUNTER FOR GENERAL HEALTH EXAMINATION: ICD-10-CM

## 2022-11-14 DIAGNOSIS — J30.1 NON-SEASONAL ALLERGIC RHINITIS DUE TO POLLEN: Primary | ICD-10-CM

## 2022-11-14 LAB
ALBUMIN SERPL BCG-MCNC: 4.7 G/DL (ref 3.5–5.2)
ALP SERPL-CCNC: 66 U/L (ref 40–129)
ALT SERPL W P-5'-P-CCNC: 18 U/L (ref 10–50)
ANION GAP SERPL CALCULATED.3IONS-SCNC: 12 MMOL/L (ref 7–15)
AST SERPL W P-5'-P-CCNC: 36 U/L (ref 10–50)
BILIRUB SERPL-MCNC: 0.3 MG/DL
BUN SERPL-MCNC: 18.2 MG/DL (ref 6–20)
CALCIUM SERPL-MCNC: 9.7 MG/DL (ref 8.6–10)
CHLORIDE SERPL-SCNC: 100 MMOL/L (ref 98–107)
CREAT SERPL-MCNC: 0.96 MG/DL (ref 0.67–1.17)
DEPRECATED HCO3 PLAS-SCNC: 26 MMOL/L (ref 22–29)
ERYTHROCYTE [DISTWIDTH] IN BLOOD BY AUTOMATED COUNT: 11.9 % (ref 10–15)
GFR SERPL CREATININE-BSD FRML MDRD: >90 ML/MIN/1.73M2
GLUCOSE SERPL-MCNC: 71 MG/DL (ref 70–99)
HCT VFR BLD AUTO: 46.1 % (ref 40–53)
HGB BLD-MCNC: 15.1 G/DL (ref 13.3–17.7)
MCH RBC QN AUTO: 28.4 PG (ref 26.5–33)
MCHC RBC AUTO-ENTMCNC: 32.8 G/DL (ref 31.5–36.5)
MCV RBC AUTO: 87 FL (ref 78–100)
PLATELET # BLD AUTO: 247 10E3/UL (ref 150–450)
POTASSIUM SERPL-SCNC: 4 MMOL/L (ref 3.4–5.3)
PROT SERPL-MCNC: 7.3 G/DL (ref 6.4–8.3)
RBC # BLD AUTO: 5.32 10E6/UL (ref 4.4–5.9)
SODIUM SERPL-SCNC: 138 MMOL/L (ref 136–145)
WBC # BLD AUTO: 7.3 10E3/UL (ref 4–11)

## 2022-11-14 PROCEDURE — 87389 HIV-1 AG W/HIV-1&-2 AB AG IA: CPT | Performed by: INTERNAL MEDICINE

## 2022-11-14 PROCEDURE — 99395 PREV VISIT EST AGE 18-39: CPT | Performed by: INTERNAL MEDICINE

## 2022-11-14 PROCEDURE — 36415 COLL VENOUS BLD VENIPUNCTURE: CPT | Performed by: INTERNAL MEDICINE

## 2022-11-14 PROCEDURE — 85027 COMPLETE CBC AUTOMATED: CPT | Performed by: INTERNAL MEDICINE

## 2022-11-14 PROCEDURE — 80053 COMPREHEN METABOLIC PANEL: CPT | Performed by: INTERNAL MEDICINE

## 2022-11-14 PROCEDURE — 86803 HEPATITIS C AB TEST: CPT | Performed by: INTERNAL MEDICINE

## 2022-11-14 ASSESSMENT — ENCOUNTER SYMPTOMS
ARTHRALGIAS: 0
CHILLS: 0
MYALGIAS: 0
HEMATURIA: 0
PARESTHESIAS: 0
NERVOUS/ANXIOUS: 0
WEAKNESS: 0
SHORTNESS OF BREATH: 0
DIZZINESS: 0
PALPITATIONS: 0
HEADACHES: 0
EYE PAIN: 0
SORE THROAT: 0
HEARTBURN: 0
DIARRHEA: 0
DYSURIA: 0
HEMATOCHEZIA: 0
ABDOMINAL PAIN: 0
NAUSEA: 0
FEVER: 0
CONSTIPATION: 0
COUGH: 0
FREQUENCY: 0
JOINT SWELLING: 0

## 2022-11-14 NOTE — PROGRESS NOTES
SUBJECTIVE:   CC: Giles is an 27 year old who presents for preventative health visit.     HPI:  Nasopharyngeal congestion at times.  Has seasonal and perennial allergies.  Mild.  No history of asthma.      Patient has been advised of split billing requirements and indicates understanding: Yes  Healthy Habits:     Getting at least 3 servings of Calcium per day:  Yes    Bi-annual eye exam:  NO    Dental care twice a year:  Yes    Sleep apnea or symptoms of sleep apnea:  None    Diet:  Regular (no restrictions)    Frequency of exercise:  6-7 days/week    Duration of exercise:  45-60 minutes    Taking medications regularly:  Yes    Medication side effects:  Not applicable and None    PHQ-2 Total Score: 0    Additional concerns today:  No    Today's PHQ-2 Score:   PHQ-2 ( 1999 Pfizer) 11/13/2022   Q1: Little interest or pleasure in doing things 0   Q2: Feeling down, depressed or hopeless 0   PHQ-2 Score 0   PHQ-2 Total Score (12-17 Years)- Positive if 3 or more points; Administer PHQ-A if positive -   Q1: Little interest or pleasure in doing things Not at all   Q2: Feeling down, depressed or hopeless Not at all   PHQ-2 Score 0     Abuse: Current or Past(Physical, Sexual or Emotional)- No  Do you feel safe in your environment? Yes    Social History     Tobacco Use     Smoking status: Never     Smokeless tobacco: Never   Substance Use Topics     Alcohol use: Yes     Alcohol/week: 1.0 - 5.0 standard drink     Types: 1 - 5 Standard drinks or equivalent per week     Alcohol Use 11/13/2022   Prescreen: >3 drinks/day or >7 drinks/week? No   Prescreen: >3 drinks/day or >7 drinks/week? -   AUDIT SCORE  -     Reviewed orders with patient. Reviewed health maintenance and updated orders accordingly - Yes    Reviewed and updated as needed this visit by clinical staff   Tobacco   Meds            Reviewed and updated as needed this visit by Provider     Past Medical History:   Diagnosis Date     Acne      Amblyopia      Hx of kidney  "disease     ureter issue, resolved     Strabismus       Past Surgical History:   Procedure Laterality Date     NO HISTORY OF SURGERY         Review of Systems   Constitutional: Negative for chills and fever.   HENT: Positive for congestion. Negative for ear pain, hearing loss and sore throat.    Eyes: Negative for pain and visual disturbance.   Respiratory: Negative for cough and shortness of breath.    Cardiovascular: Negative for chest pain, palpitations and peripheral edema.   Gastrointestinal: Negative for abdominal pain, constipation, diarrhea, heartburn, hematochezia and nausea.   Genitourinary: Negative for dysuria, frequency, genital sores, hematuria, impotence, penile discharge and urgency.   Musculoskeletal: Negative for arthralgias, joint swelling and myalgias.   Skin: Negative for rash.   Neurological: Negative for dizziness, weakness, headaches and paresthesias.   Psychiatric/Behavioral: Negative for mood changes. The patient is not nervous/anxious.      OBJECTIVE:     PHYSICAL EXAM:  General Appearance: In no acute distress  /74 (BP Location: Left arm, Patient Position: Sitting, Cuff Size: Adult Large)   Pulse 91   Temp 98.3  F (36.8  C) (Tympanic)   Ht 1.905 m (6' 3\")   Wt 63.6 kg (140 lb 4.8 oz)   SpO2 99%   BMI 17.54 kg/m    EYES: Clear, fundi are unremarkable, discs flat   HEENT: nose and throat clear, ears normal   NECK:  without cervical or axillary adenopathy  RESPIRATORY: Clear  CARDIOVASCULAR: S1, S2  ABDOMEN: soft, flat, and non-tender, without mass, rebound, or guarding  RECTAL: deferred  GENITOURINARY: normal testes and phallus  EXTREMITIES: No joint swelling, no ulcer or edema  NEUROLOGIC: No arm or leg  weakness, speech is clear, gait is normal    ASSESSMENT/PLAN:     José Luis was seen today for recheck medication and physical.    Diagnoses and all orders for this visit:    Non-seasonal allergic rhinitis     Patient has been advised of split billing requirements and indicates " "understanding: Yes    COUNSELING:   Reviewed preventive health counseling, as reflected in patient instructions       Regular exercise       Healthy diet/nutrition    Estimated body mass index is 17.54 kg/m  as calculated from the following:    Height as of this encounter: 1.905 m (6' 3\").    Weight as of this encounter: 63.6 kg (140 lb 4.8 oz).     He reports that he has never smoked. He has never used smokeless tobacco.    Bartolome Knapp MD  [facility]  "

## 2022-11-15 LAB
HCV AB SERPL QL IA: NONREACTIVE
HIV 1+2 AB+HIV1 P24 AG SERPL QL IA: NONREACTIVE

## 2022-12-02 ENCOUNTER — ALLIED HEALTH/NURSE VISIT (OUTPATIENT)
Dept: ALLERGY | Facility: CLINIC | Age: 27
End: 2022-12-02
Payer: COMMERCIAL

## 2022-12-02 DIAGNOSIS — Z51.6 NEED FOR DESENSITIZATION TO ALLERGENS: Primary | ICD-10-CM

## 2022-12-02 PROCEDURE — 95125 IMMUNOTHERAPY 2/> INJECTIONS: CPT | Performed by: DERMATOLOGY

## 2022-12-02 PROCEDURE — 99207 PR NO CHARGE NURSE ONLY: CPT | Performed by: DERMATOLOGY

## 2022-12-02 NOTE — NURSING NOTE
Patient came in for 2 allergy injection(s). Patient had no reaction to last shots. I gave 0.5 ml of the red bottle DM, 0.5 ml of the red bottle T subcutaneous without any issues.   Patient instructed to wait in building for at least 30 min. after injections  Patient comes into clinic today at the request of Dr. Vides for allergy injections     This service provided today was under the direct supervision of Dr. Vides, who was available if needed.

## 2022-12-22 ENCOUNTER — OFFICE VISIT (OUTPATIENT)
Dept: OPHTHALMOLOGY | Facility: CLINIC | Age: 27
End: 2022-12-22
Payer: COMMERCIAL

## 2022-12-22 VITALS — HEIGHT: 75 IN | WEIGHT: 185 LBS | BODY MASS INDEX: 23 KG/M2

## 2022-12-22 DIAGNOSIS — H52.13 MYOPIA OF BOTH EYES: Primary | ICD-10-CM

## 2022-12-22 DIAGNOSIS — H50.112 MONOCULAR EXOTROPIA OF LEFT EYE: ICD-10-CM

## 2022-12-22 PROCEDURE — 92004 COMPRE OPH EXAM NEW PT 1/>: CPT | Performed by: OPTOMETRIST

## 2022-12-22 PROCEDURE — 92015 DETERMINE REFRACTIVE STATE: CPT | Performed by: OPTOMETRIST

## 2022-12-22 ASSESSMENT — VISUAL ACUITY
OD_CC: J1+
OS_CC+: -1
OD_CC: 20/20
METHOD: SNELLEN - LINEAR
OD_CC+: -2
CORRECTION_TYPE: CONTACTS
OS_CC: 20/20
OS_CC: J1+

## 2022-12-22 ASSESSMENT — CUP TO DISC RATIO
OD_RATIO: 0.1
OS_RATIO: 0.1

## 2022-12-22 ASSESSMENT — CONF VISUAL FIELD
OD_SUPERIOR_TEMPORAL_RESTRICTION: 0
OD_SUPERIOR_NASAL_RESTRICTION: 0
OD_INFERIOR_NASAL_RESTRICTION: 0
OS_INFERIOR_TEMPORAL_RESTRICTION: 0
OS_INFERIOR_NASAL_RESTRICTION: 0
OD_INFERIOR_TEMPORAL_RESTRICTION: 0
OS_NORMAL: 1
OD_NORMAL: 1
OS_SUPERIOR_NASAL_RESTRICTION: 0
METHOD: COUNTING FINGERS
OS_SUPERIOR_TEMPORAL_RESTRICTION: 0

## 2022-12-22 ASSESSMENT — SLIT LAMP EXAM - LIDS
COMMENTS: NORMAL
COMMENTS: NORMAL

## 2022-12-22 ASSESSMENT — REFRACTION_MANIFEST
OD_CYLINDER: SPHERE
OD_SPHERE: -0.75
OS_CYLINDER: SPHERE
OS_SPHERE: -1.00

## 2022-12-22 ASSESSMENT — REFRACTION_CURRENTRX
OD_SPHERE: -0.75
OD_BASECURVE: 8.5
OD_BRAND: ACUVUE OASYS 1 DAY
OS_BRAND: ACUVUE OASYS 1 DAY
OD_DIAMETER: 14.3
OS_DIAMETER: 14.3
OS_BASECURVE: 8.5
OS_SPHERE: -1.00

## 2022-12-22 ASSESSMENT — TONOMETRY
IOP_METHOD: ICARE
OS_IOP_MMHG: 17
OD_IOP_MMHG: 19

## 2022-12-22 ASSESSMENT — EXTERNAL EXAM - RIGHT EYE: OD_EXAM: NORMAL

## 2022-12-22 ASSESSMENT — EXTERNAL EXAM - LEFT EYE: OS_EXAM: NORMAL

## 2022-12-22 NOTE — NURSING NOTE
Chief Complaints and History of Present Illnesses   Patient presents with     Annual Eye Exam     Pt here for annual eye exam with contacts.      Chief Complaint(s) and History of Present Illness(es)     Annual Eye Exam            Laterality: both eyes    Associated symptoms: Negative for dryness, eye pain, tearing and discharge    Treatments tried: no treatments    Pain scale: 0/10    Comments: Pt here for annual eye exam with contacts.           Comments    Distance vision each eye not quite as clear with each eye over the past year feeling a little strained.   Wears contact full time or has been the past 3 months.   Likes having the daily and that has helped eliminate dryness as well as no longer working at night.   Not having issues with any double vision. No issues with worsening of eye wandering. Left eye wanders only when very tired or a lot of screen time. Still does not have any double vision during those moments.     Vale Briones, COT COT 8:11 AM December 22, 2022

## 2022-12-22 NOTE — PROGRESS NOTES
A/P  1.) Myopia each eye  -Currently in -0.50 CL's, noticing decreased distance vision  -Dry takes more minus but damp still seems overminused.   -Will increase CL's to -0.75 OU. Did give -1.00 to trial for left eye  -Dilated ocular health unremarkable each eye    2.) Left exotropia  -Largely asymptomatic at this time (symptomatic when was low on sleep)  -Reviewed prism vs strab surgery - would hold on all these unless symptoms worsen    Monitor 1-2 years routine, sooner prn    I have confirmed the patient's CC, HPI and reviewed Past Medical History, Past Surgical History, Social History, Family History, Problem List, Medication List and agree with Tech note.     Leighann Ayala, DOMINIC FAAO

## 2023-01-03 ENCOUNTER — ALLIED HEALTH/NURSE VISIT (OUTPATIENT)
Dept: ALLERGY | Facility: CLINIC | Age: 28
End: 2023-01-03
Payer: COMMERCIAL

## 2023-01-03 DIAGNOSIS — Z51.6 NEED FOR DESENSITIZATION TO ALLERGENS: Primary | ICD-10-CM

## 2023-01-03 PROCEDURE — 99207 PR NO CHARGE NURSE ONLY: CPT | Performed by: DERMATOLOGY

## 2023-01-03 PROCEDURE — 95125 IMMUNOTHERAPY 2/> INJECTIONS: CPT | Performed by: DERMATOLOGY

## 2023-01-03 NOTE — NURSING NOTE
Patient came in for 2 allergy injection(s). Patient had no reaction to last shots. I gave 0.5 ml of the red bottle DM and 0.5 ml of the red bottle T subcutaneous without any issues.   Patient instructed to wait in building for at least 30 min. after injections  Patient comes into clinic today at the request of Dr. Vides for allergy injections     This service provided today was under the direct supervision of Dr. Vides, who was available if needed.

## 2023-02-03 ENCOUNTER — ALLIED HEALTH/NURSE VISIT (OUTPATIENT)
Dept: ALLERGY | Facility: CLINIC | Age: 28
End: 2023-02-03
Payer: COMMERCIAL

## 2023-02-03 DIAGNOSIS — Z51.6 NEED FOR DESENSITIZATION TO ALLERGENS: Primary | ICD-10-CM

## 2023-02-03 PROCEDURE — 99207 PR NO CHARGE NURSE ONLY: CPT | Performed by: DERMATOLOGY

## 2023-02-03 PROCEDURE — 95125 IMMUNOTHERAPY 2/> INJECTIONS: CPT | Performed by: DERMATOLOGY

## 2023-02-03 NOTE — PROGRESS NOTES
Patient came in for 2 Allergy injection. Patient had no reaction to last shots. I gave 0.5 TMX 0.5 DM ml of the Redbottle subcutaneous without any issues.   Patient instructed to wait in building for at least 30 min. after injections  Patients comes into clinic today at the request of Dr. Vides for allergy injections     No reaction to last injection.     This service provided today was under the direct supervision of Dr. Vides , who was available if needed.     Macarena Pelaez, CMA

## 2023-03-10 ENCOUNTER — TELEPHONE (OUTPATIENT)
Dept: ALLERGY | Facility: CLINIC | Age: 28
End: 2023-03-10

## 2023-03-10 ENCOUNTER — ALLIED HEALTH/NURSE VISIT (OUTPATIENT)
Dept: ALLERGY | Facility: CLINIC | Age: 28
End: 2023-03-10
Payer: COMMERCIAL

## 2023-03-10 DIAGNOSIS — R09.82 ALLERGIC RHINITIS WITH POSTNASAL DRIP: ICD-10-CM

## 2023-03-10 DIAGNOSIS — J30.9 ALLERGIC RHINITIS WITH POSTNASAL DRIP: ICD-10-CM

## 2023-03-10 DIAGNOSIS — Z51.6 NEED FOR DESENSITIZATION TO ALLERGENS: Primary | ICD-10-CM

## 2023-03-10 DIAGNOSIS — Z91.09 HOUSE DUST MITE ALLERGY: ICD-10-CM

## 2023-03-10 DIAGNOSIS — H10.10 ALLERGIC RHINOCONJUNCTIVITIS: ICD-10-CM

## 2023-03-10 DIAGNOSIS — J30.9 ALLERGIC RHINOCONJUNCTIVITIS: ICD-10-CM

## 2023-03-10 DIAGNOSIS — Z51.6 NEED FOR DESENSITIZATION TO ALLERGENS: ICD-10-CM

## 2023-03-10 PROCEDURE — 95125 IMMUNOTHERAPY 2/> INJECTIONS: CPT | Performed by: DERMATOLOGY

## 2023-03-10 PROCEDURE — 99207 PR NO CHARGE NURSE ONLY: CPT | Performed by: DERMATOLOGY

## 2023-03-10 NOTE — PROGRESS NOTES
Patient came in for 2 allergy injection(s). Patient had no reaction to last shots. I gave 0.5 ml of the red bottle DM, 0.5mL of the red bottle Tree subcutaneous without any issues.    Tree vial now empty, will speak to Dr Vides for future plan d/t serums becoming unavailable through .     Patient instructed to wait in building for at least 30 min. after injections  Patient comes into clinic today at the request of Dr. Vides for allergy injections     This service provided today was under the direct supervision of Dr. Vides, who was available if needed.

## 2023-03-13 ENCOUNTER — TELEPHONE (OUTPATIENT)
Dept: ALLERGY | Facility: CLINIC | Age: 28
End: 2023-03-13
Payer: COMMERCIAL

## 2023-03-13 NOTE — TELEPHONE ENCOUNTER
Sent tree IT orders to compounding.    Addendum 3/21/23:  Received tree serum from compounding.  Brittany Ledezma RN

## 2023-03-20 ENCOUNTER — THERAPY VISIT (OUTPATIENT)
Dept: ALLERGY | Facility: CLINIC | Age: 28
End: 2023-03-20
Payer: COMMERCIAL

## 2023-03-20 DIAGNOSIS — R09.82 ALLERGIC RHINITIS WITH POSTNASAL DRIP: ICD-10-CM

## 2023-03-20 DIAGNOSIS — J30.9 ALLERGIC RHINITIS WITH POSTNASAL DRIP: ICD-10-CM

## 2023-03-20 DIAGNOSIS — J30.9 ALLERGIC RHINOCONJUNCTIVITIS: ICD-10-CM

## 2023-03-20 DIAGNOSIS — H10.10 ALLERGIC RHINOCONJUNCTIVITIS: ICD-10-CM

## 2023-03-20 DIAGNOSIS — Z51.6 NEED FOR DESENSITIZATION TO ALLERGENS: Primary | ICD-10-CM

## 2023-03-20 NOTE — PROGRESS NOTES
Date of Immunotherapy mixing: Mar 20, 2023    Mix name: 9 tree mix center Al ALK.    Dilution: red 1:1    Vial size: 5ml each dilution      Mixer name: Osiris Cabral CPhT     Units to bill: 10U     Provider attestation: Manish WIGGINS MD supervised the mixing of these extracts while on site

## 2023-04-14 ENCOUNTER — ALLIED HEALTH/NURSE VISIT (OUTPATIENT)
Dept: ALLERGY | Facility: CLINIC | Age: 28
End: 2023-04-14
Payer: COMMERCIAL

## 2023-04-14 DIAGNOSIS — Z51.6 NEED FOR DESENSITIZATION TO ALLERGENS: Primary | ICD-10-CM

## 2023-04-14 PROCEDURE — 99207 PR NO CHARGE NURSE ONLY: CPT | Performed by: DERMATOLOGY

## 2023-04-14 PROCEDURE — 95125 IMMUNOTHERAPY 2/> INJECTIONS: CPT | Performed by: DERMATOLOGY

## 2023-04-14 NOTE — PROGRESS NOTES
Patient came in for 2 allergy injection(s). Patient had no reaction to last shots. I gave 0.3 ml of the red bottle Tree, 0.5mL of the red bottle DM subcutaneous without any issues.   Patient instructed to wait in building for at least 30 min. after injections  Patient comes into clinic today at the request of Dr. Vides for allergy injections     This service provided today was under the direct supervision of Dr. Vides, who was available if needed.

## 2023-05-12 ENCOUNTER — ALLIED HEALTH/NURSE VISIT (OUTPATIENT)
Dept: ALLERGY | Facility: CLINIC | Age: 28
End: 2023-05-12
Payer: COMMERCIAL

## 2023-05-12 DIAGNOSIS — Z51.6 NEED FOR DESENSITIZATION TO ALLERGENS: Primary | ICD-10-CM

## 2023-05-12 PROCEDURE — 95125 IMMUNOTHERAPY 2/> INJECTIONS: CPT | Performed by: DERMATOLOGY

## 2023-05-12 PROCEDURE — 99207 PR NO CHARGE NURSE ONLY: CPT | Performed by: DERMATOLOGY

## 2023-05-12 NOTE — NURSING NOTE
Patient came in for 2 allergy injection(s). Patient had no reaction to last shots. I gave 0.5 ml of the red bottle DM and 0.3mL of the red bottle T MX subcutaneous without any issues.   Patient instructed to wait in building for at least 30 min. after injections  Patient comes into clinic today at the request of Dr. Vides for allergy injections     This service provided today was under the direct supervision of Dr. Vides, who was available if needed.

## 2023-06-08 DIAGNOSIS — Z11.1 SCREENING EXAMINATION FOR PULMONARY TUBERCULOSIS: Primary | ICD-10-CM

## 2023-06-09 ENCOUNTER — ALLIED HEALTH/NURSE VISIT (OUTPATIENT)
Dept: ALLERGY | Facility: CLINIC | Age: 28
End: 2023-06-09
Payer: COMMERCIAL

## 2023-06-09 ENCOUNTER — LAB (OUTPATIENT)
Dept: LAB | Facility: CLINIC | Age: 28
End: 2023-06-09
Payer: COMMERCIAL

## 2023-06-09 DIAGNOSIS — Z51.6 NEED FOR DESENSITIZATION TO ALLERGENS: Primary | ICD-10-CM

## 2023-06-09 DIAGNOSIS — Z11.1 SCREENING EXAMINATION FOR PULMONARY TUBERCULOSIS: ICD-10-CM

## 2023-06-09 PROCEDURE — 86481 TB AG RESPONSE T-CELL SUSP: CPT

## 2023-06-09 PROCEDURE — 99207 PR NO CHARGE NURSE ONLY: CPT | Performed by: DERMATOLOGY

## 2023-06-09 PROCEDURE — 36415 COLL VENOUS BLD VENIPUNCTURE: CPT

## 2023-06-09 PROCEDURE — 95125 IMMUNOTHERAPY 2/> INJECTIONS: CPT | Performed by: DERMATOLOGY

## 2023-06-09 NOTE — NURSING NOTE
Patient came in for 2 allergy injection(s). Patient had no reaction to last shots. I gave 0.3 ml of the red bottle tree and 0.5 ml of the red bottle DM subcutaneous without any issues.   Patient instructed to wait in building for at least 30 min. after injections  Patient comes into clinic today at the request of Dr. Vides for allergy injections     This service provided today was under the direct supervision of Dr. Vides, who was available if needed.

## 2023-06-12 LAB
GAMMA INTERFERON BACKGROUND BLD IA-ACNC: 0.01 IU/ML
M TB IFN-G BLD-IMP: NEGATIVE
M TB IFN-G CD4+ BCKGRND COR BLD-ACNC: 9.99 IU/ML
MITOGEN IGNF BCKGRD COR BLD-ACNC: 0.02 IU/ML
MITOGEN IGNF BCKGRD COR BLD-ACNC: 0.02 IU/ML
QUANTIFERON MITOGEN: 10 IU/ML
QUANTIFERON NIL TUBE: 0.01 IU/ML
QUANTIFERON TB1 TUBE: 0.03 IU/ML
QUANTIFERON TB2 TUBE: 0.03

## 2023-06-20 ENCOUNTER — OFFICE VISIT (OUTPATIENT)
Dept: ALLERGY | Facility: CLINIC | Age: 28
End: 2023-06-20
Payer: COMMERCIAL

## 2023-06-20 DIAGNOSIS — R22.1 LOCALIZED SWELLING, MASS AND LUMP, NECK: ICD-10-CM

## 2023-06-20 DIAGNOSIS — Z91.09 HOUSE DUST MITE ALLERGY: Primary | ICD-10-CM

## 2023-06-20 PROCEDURE — 99213 OFFICE O/P EST LOW 20 MIN: CPT | Performed by: DERMATOLOGY

## 2023-06-20 NOTE — PROGRESS NOTES
[facility] Dermato-allergology Note  Office visit  Encounter Date: Jun 20, 2023  ____________________________________________    CC: Allergy Testing Followup (Pt is here for an allergy follow up. Pt has been having pressure to neck/throat for past week, possible swelling? )      HPI:  (Jun 20, 2023)  Mr. José Luis Palacios is a(n) 28 year old male who presents today as a return patient for allergy consultation  - since about 2 months on and off on the neck right side first just short term and now there for days lump feeling around the neck/throat  - had in [place] intense symptoms (in [place]) with RC, but in [place] less symptoms. Last summer in late August in [place] ==> probably ragweed  - otherwise feeling well in usual state of health    Physical exam:  General: In no acute distress, well-developed, well-nourished  Eyes: no conjunctivitis  ENT: no signs of rhinitis --> no lymph nodes mandibular or neck  Pulmonary: no wheezing or coughing  Skin: no lesions    Earlier History and Allergy exams:  (Sep 20, 2022)  - follow up of IT started in September 2020 with dust mite and tree mix extracts  - had in April 2021 to dust mite one time strong local reaction, but since then no problems with injections  - this spring only slight eye symptoms and used for about 2 weeks eye drops. No major problems with nose.  - takes daily zyrtec and Flonase every morning. About 6 days without Zyrtec and Flonase --> more nasal congestion    Earlier History and Allergy exams:  Last visit 05/26/20  Patient developed about 1 month ago bad conjunctivitis and less runny nose. However, patient is using Flonase and eye drops (vasoconstrictor). Took every morning Cetirizine and th    However, patient still has in the morning and night from October to April very disturbing postnasal drip and recurrent pharyngitis with tonsil swelling. With Flonase somehow less problems, but even with HDM reduction still problems and patient still  pretty annoyed by symptoms.   In addition, symptoms in early spring from the early blooming trees (see the prick test results!). With pollens more conjunctivitis.  Less problems in early summer and summer, which indicates that the pollen problem is really more due to early blooming trees.    Discussed with patient options for Immunotherapy and his mother seems to have had 8 years of IT with decent success.  Patient would be interested to start IT.    CC:   Allergy Testing Followup (Pt is here for an allergy follow up. Pt has been having pressure to neck/throat for past week, possible swelling? )      Previous History of Present Illness:  Mr. José Luis Palacios is a 28 year old male who presents as a referral from Self.    Since about 2 years mostly night and morning nasal inflammation, postnasal drip, less congestion, no conjunctivitis and no coughing/asthma    Patient has as well in April/Mai since 5-6 itchy eyes and some Rhinitis.    Patient was using 2-3 weeks Flonase = no change  Antihistamines not really taken and if taken no big difference and Ranitidine did not change as well     Patient has Halo Nevi    Past Medical History:   Patient Active Problem List   Diagnosis     Allergies     Allergic rhinitis due to pollen     Past Medical History:   Diagnosis Date     Allergic rhinitis due to pollen      Amblyopia      Hx of kidney disease     ureter issue, resolved     Strabismus        Allergy History:   No Known Allergies    Sister and mother has some reactions to cat and dogs. Patient not (grew up with pets)    Social History:  Patient is nurse in pediatric cardiovascular ICU     reports that he has never smoked. He has never used smokeless tobacco. He reports current alcohol use of about 1.0 - 5.0 standard drink of alcohol per week. He reports current drug use. Drug: Marijuana.      Family History:  Family History   Problem Relation Age of Onset     Hypertension Father      Mitral valve prolapse Father          regurg     Skin Cancer Mother         sun related     No Known Problems Sister      No Known Problems Maternal Grandmother      Hypertension Maternal Grandfather      Breast Cancer Paternal Grandmother      Coronary Artery Disease Paternal Grandfather         heart attack     Suicide Maternal Uncle      Glaucoma No family hx of      Macular Degeneration No family hx of        Medications:  Current Outpatient Medications   Medication Sig Dispense Refill     fluticasone (FLONASE) 50 MCG/ACT nasal spray Spray 1 spray into both nostrils daily 16 g 1     loratadine (CLARITIN) 10 MG tablet Take 10 mg by mouth daily       Multiple Vitamin (MULTI VITAMIN DAILY PO) Take 1 tablet by mouth daily       ORDER FOR ALLERGEN IMMUNOTHERAPY Name of Mix: Tree mix [stock solution 1:1 red]:  Birch 2ml, ,Nic 1ml, Box Elder 1ml, Oak white 0.5ml and Hickory shagbark 0.5ml   All Center Al ALK 10,000 PNU/ml   Total Volume 5 ml (HSA qs to 5ml)   Please provide following vials  red = 1:1   Deliver within the next 2 weeks  Order date 03/10/2023    Prescribing MD: Manish Vides Date of order:     Stock solution (red) 10,000AU  5ml    Please provide as well following dilutions (5ml each)  1) red = 1:1    Diluent: HSA 5 mL PRN     ORDER FOR ALLERGEN IMMUNOTHERAPY Name of Mix: ALK STMM standardized extract STMM  50% D. Farinae and 50% D. Pteronyssinus  Stock solution (red) 10,000AU  5ml    Please provide as well following dilutions (5ml each)  1) red = 1:1    Diluent: HSA 5 mL PRN     ORDER FOR ALLERGEN IMMUNOTHERAPY Name of Mix: dust mite mix; Ag #1/ Name Allergen: Std Mite, Mixed/ : ALK/ Concentration of Extract: 10,000 Au/ml / Total Antigen Volume: 5ml. 5 mL PRN     predniSONE (DELTASONE) 50 MG tablet Emergency set: if severe allergic reaction take immediately 100mg Prednisone (2x50mg) and 2 Tabl Allegra 180mg. 2 tablet 3     tretinoin (RETIN-A) 0.025 % external cream          Allergy Tests:    Atopy Screen (Placed 02/26/20  )    No Substance Readings (15 min) Evaluation   POS Histamine 1mg/ml ++    NEG NaCl 0.9% -      No Substance Readings (15 min) Evaluation   1 Alternaria alternata (tenuis)  -    2 Cladosporium herbarum -    3 Aspergillus fumigatus -    4 Penicillium notatum -    5 Dermatophagoides pteronyssinus -    6 Dermatophagoides farinae -    7 Dog epithelium (canis spp) -    8 Cat hair (isa catus) -    9 Cockroach   (Blatella americana & germanica) -    10 Grass mix midwest   (Sushila, Orchard, Redtop, Toney) ++    11 Miguel grass (sorghum halepense) (+)    12 [place] mix   (common Cocklebur, Lamb s quarters, rough redroot Pigweed, Dock/Sorrel) -    13 Mug wort (artemisia vulgare) -    14 Ragweed giant/short (ambrosia spp) +    15 English Plantain (plantago lanceolata) +    16 Tree mix 1 (Pecan, Maple BHR, Oak RVW, american [place], black [place]) ++    17 Red cedar (juniperus virginia) -    18 Tree mix 2   (white Nic, river/red Birch, black [place], common [place], american Elm) +++    19 Box elder/Maple mix (acer spp) ++    20 Hickory shagbark (carya ovata) ++       -      Conclusion: mostly tree and grass pollen sensitization, where the tree pollen allergy is probably clinically relevant. Less the grass pollens    Intradermal Testing (Placed 02/26/20 )     No Substance Conc.  Readings (15min) Evaluation   1 NaCl  0.9% -     2 Histamine (prick) 0.1mg / ml ++     4 Standard Dust Mite - D. Farinae 1:10 ++ 10mmP/25mmE   5 Standard Dust Mite - D. Pteronyssinus 1:10 + 5mmP/8mmE   10 Aspergillus fumigatus  1:10 -     11 Penicillium notatum 1:10 -        Conclusion: strong sensitization to D. Farinae, less to D. Pteronyssinus and no immediate reaction to molds    Atopy Screen (Placed Sep 20, 2022)  No Substance Readings (15 min) Evaluation   POS Histamine 1mg/ml ++    NEG NaCl 0.9% -      No Substance Readings (15 min) Evaluation   1 Alternaria alternata (tenuis)  -    2 Cladosporium herbarum -    3 Aspergillus fumigatus -    4  Penicillium notatum -    5 Dermatophagoides pteronyssinus -    6 Dermatophagoides farinae -    7 Dog epithelium (canis spp) -    8 Cat hair (isa catus) -    9 Cockroach   (Blatella americana & germanica) ++    10 Grass mix midwest   (Sushila, Orchard, Redtop, Toney) ++    11 Miguel grass (sorghum halepense) ++    12 Weed mix   (common Cocklebur, Lamb s quarters, rough redroot Pigweed, Dock/Sorrel) (+)    13 Mug wort (artemisia vulgare) +    14 Ragweed giant/short (ambrosia spp) +    15 White birch (Betula papyrifera) ++    16 Tree mix 1 (Pecan, Maple BHR, Oak RVW, american [place], black [place]) +/++    17 Red cedar (juniperus virginia) -    18 Tree mix 2   (white Nic, river/red Birch, black [place], common [place], american Elm) -    19 Box elder/Maple mix (acer spp) -    20 [place] shagbark (carya ovata) ++           Conclusion: some reduction of reactivity to tree pollens and as expected prick tests negatives to dust mites (however, cockroach positive). The grass pollen allergy still strong like 2 years ago, but still clinically not very relevant       Assessment & Plan:    ==> Final Diagnosis:     # perennial Rhinitis and postnasal drip mostly night/morning  * chronic illness with exacerbation, progression, side effects from treatment    In intradermal tests immediate type sensitization to house dust mites (not molds)  --> reduce house dust mites = info given    >> seasonal Rhinoconjunctivitis in spring with sensitization to tree pollens    Probably clinically less relevant to grass pollens  * chronic illness with exacerbation, progression, side effects from treatment      # lump feeling in neck --> probably not allergic  * acute illness with exacerbation, progression, side effects from treatment  Referral to ENT    These conclusions are made at the best of one's knowledge and belief based on the provided evidence such as patient's history and allergy test results and they can change over time or can be  incomplete because of missing information's.      ==> Treatment Plan:    >> continue Immunotherapy for following extracts:    - house dust mites ALK standardized 50% D. F. And 50% D.p.STMM 10,000 AU right arm = continue monthly HOUSE DUST MITES    --> symptoms in winter still there, but improved = still some reactions after IT    - tree pollen mix left arm center Al 9 tree mix ([place], White Nic, Black Birch, American Elm, Shagbark, Hickory, Maple (Sugar), White Oak, White [place], American [place]) ==> now 3 years in IT and center Al not anymore available = stop for the moment (had less skin reactions recently after injections)    ==> discuss maybe in winter or spring about pollen IT and maybe then repeat prick tests (Icelandic extracts?)    >> maybe take Cetirizine 10mg every evening and try to stop Flonase    Patient counseling:  About IT, possible side effects (Urticaria, Anaphylaxis, Asthma, local reactions) and that patient has to wait after each shot 30min and that first 4-5 months weekly and then monthly. Explained as well emergency set (without the Epipen!) and that it should be with patient the day of IT (given handout in wrap up).    ___________________________    Staff: : provider    Follow-up in Derm-Allergy clinic in about 1 year  ___________________________    I spent a total of 20 minutes with José Luis Palacios during today s  visit. This time was spent discussing all the individual test results, correlating them to the clinical relevance, counseling the patient and/or coordinating care    no

## 2023-06-20 NOTE — LETTER
6/20/2023         RE: José Luis Palacios  [address]  [place] [address]        Dear Colleague,    Thank you for referring your patient, José Luis Palacios, to the [facility] [place]. Please see a copy of my visit note below.    [facility] Dermato-[facility]ergology Note  {[id number]}  Encounter Date: Jun 20, 2023  ____________________________________________    CC: Allergy Testing Followup (Pt is here for an allergy follow up. Pt has been having pressure to neck/throat for past week, possible swelling? )      HPI:  (Jun 20, 2023)  Mr. José Luis Palacios is a(n) 28 year old male who presents today as a return patient for allergy consultation  - since about 2 months on and off on the neck right side first just short term and now there for days lump feeling around the neck/throat  - had in [place] intense symptoms (in [place]) with RC, but in [place] less symptoms. Last summer in late August in [place] ==> probably ragweed  - otherwise feeling well in usual state of health    Physical exam:  General: In no acute distress, well-developed, well-nourished  Eyes: no conjunctivitis  ENT: no signs of rhinitis --> no lymph nodes mandibular or neck  Pulmonary: no wheezing or coughing  Skin: no lesions    Earlier History and Allergy exams:  (Sep 20, 2022)  - follow up of IT started in September 2020 with dust mite and tree mix extracts  - had in April 2021 to dust mite one time strong local reaction, but since then no problems with injections  - this spring only slight eye symptoms and used for about 2 weeks eye drops. No major problems with nose.  - takes daily zyrtec and Flonase every morning. About 6 days without Zyrtec and Flonase --> more nasal congestion    Earlier History and Allergy exams:  Last visit 05/26/20  Patient developed about 1 month ago bad conjunctivitis and less runny nose. However, patient is using Flonase and eye drops (vasoconstrictor). Took every  morning Cetirizine and th    However, patient still has in the morning and night from October to April very disturbing postnasal drip and recurrent pharyngitis with tonsil swelling. With Flonase somehow less problems, but even with HDM reduction still problems and patient still pretty annoyed by symptoms.   In addition, symptoms in early spring from the early blooming trees (see the prick test results!). With pollens more conjunctivitis.  Less problems in early summer and summer, which indicates that the pollen problem is really more due to early blooming trees.    Discussed with patient options for Immunotherapy and his mother seems to have had 8 years of IT with decent success.  Patient would be interested to start IT.    CC:   Allergy Testing Followup (Pt is here for an allergy follow up. Pt has been having pressure to neck/throat for past week, possible swelling? )      Previous History of Present Illness:  Mr. José Luis Palacios is a 28 year old male who presents as a referral from Self.    Since about 2 years mostly night and morning nasal inflammation, postnasal drip, less congestion, no conjunctivitis and no coughing/asthma    Patient has as well in April/Mai since 5-6 itchy eyes and some Rhinitis.    Patient was using 2-3 weeks Flonase = no change  Antihistamines not really taken and if taken no big difference and Ranitidine did not change as well     Patient has Halo Nevi    Past Medical History:   Patient Active Problem List   Diagnosis     Allergies     Allergic rhinitis due to pollen     Past Medical History:   Diagnosis Date     Allergic rhinitis due to pollen      Amblyopia      Hx of kidney disease     ureter issue, resolved     Strabismus        Allergy History:   No Known Allergies    Sister and mother has some reactions to cat and dogs. Patient not (grew up with pets)    Social History:  Patient is nurse in pediatric cardiovascular ICU     reports that he has never smoked. He has never used  smokeless tobacco. He reports current alcohol use of about 1.0 - 5.0 standard drink of alcohol per week. He reports current drug use. Drug: Marijuana.      Family History:  Family History   Problem Relation Age of Onset     Hypertension Father      Mitral valve prolapse Father         regurg     Skin Cancer Mother         sun related     No Known Problems Sister      No Known Problems Maternal Grandmother      Hypertension Maternal Grandfather      Breast Cancer Paternal Grandmother      Coronary Artery Disease Paternal Grandfather         heart attack     Suicide Maternal Uncle      Glaucoma No family hx of      Macular Degeneration No family hx of        Medications:  Current Outpatient Medications   Medication Sig Dispense Refill     fluticasone (FLONASE) 50 MCG/ACT nasal spray Spray 1 spray into both nostrils daily 16 g 1     loratadine (CLARITIN) 10 MG tablet Take 10 mg by mouth daily       Multiple Vitamin (MULTI VITAMIN DAILY PO) Take 1 tablet by mouth daily       ORDER FOR ALLERGEN IMMUNOTHERAPY Name of Mix: Tree mix [stock solution 1:1 red]:  Birch 2ml, ,Inc 1ml, Box Elder 1ml, Oak white 0.5ml and Hickory shagbark 0.5ml   All Center Al ALK 10,000 PNU/ml   Total Volume 5 ml (HSA qs to 5ml)   Please provide following vials  red = 1:1   Deliver within the next 2 weeks  Order date 03/10/2023    Prescribing MD: Manish Vides Date of order:     Stock solution (red) 10,000AU  5ml    Please provide as well following dilutions (5ml each)  1) red = 1:1    Diluent: HSA 5 mL PRN     ORDER FOR ALLERGEN IMMUNOTHERAPY Name of Mix: ALK STMM standardized extract STMM  50% D. Farinae and 50% D. Pteronyssinus  Stock solution (red) 10,000AU  5ml    Please provide as well following dilutions (5ml each)  1) red = 1:1    Diluent: HSA 5 mL PRN     ORDER FOR ALLERGEN IMMUNOTHERAPY Name of Mix: dust mite mix; Ag #1/ Name Allergen: Std Mite, Mixed/ : ALK/ Concentration of Extract: 10,000 Au/ml / Total Antigen Volume:  5ml. 5 mL PRN     predniSONE (DELTASONE) 50 MG tablet Emergency set: if severe allergic reaction take immediately 100mg Prednisone (2x50mg) and 2 Tabl Allegra 180mg. 2 tablet 3     tretinoin (RETIN-A) 0.025 % external cream          Allergy Tests:    Atopy Screen (Placed 02/26/20 )    No Substance Readings (15 min) Evaluation   POS Histamine 1mg/ml ++    NEG NaCl 0.9% -      No Substance Readings (15 min) Evaluation   1 Alternaria alternata (tenuis)  -    2 Cladosporium herbarum -    3 Aspergillus fumigatus -    4 Penicillium notatum -    5 Dermatophagoides pteronyssinus -    6 Dermatophagoides farinae -    7 Dog epithelium (canis spp) -    8 Cat hair (isa catus) -    9 Cockroach   (Blatella americana & germanica) -    10 Grass mix midwest   (Sushila, Orchard, Redtop, Toney) ++    11 Miguel grass (sorghum halepense) (+)    12 [place] mix   (common Cocklebur, Lamb s quarters, rough redroot Pigweed, Dock/Sorrel) -    13 Mug wort (artemisia vulgare) -    14 Ragweed giant/short (ambrosia spp) +    15 English Plantain (plantago lanceolata) +    16 Tree mix 1 (Pecan, Maple BHR, Oak RVW, american [place], black [place]) ++    17 Red cedar (juniperus virginia) -    18 Tree mix 2   (white Nic, river/red Birch, black [place], common [place], american Elm) +++    19 Box elder/Maple mix (acer spp) ++    20 Hickory shagbark (carya ovata) ++       -      Conclusion: mostly tree and grass pollen sensitization, where the tree pollen allergy is probably clinically relevant. Less the grass pollens    Intradermal Testing (Placed 02/26/20 )     No Substance Conc.  Readings (15min) Evaluation   1 NaCl  0.9% -     2 Histamine (prick) 0.1mg / ml ++     4 Standard Dust Mite - D. Farinae 1:10 ++ 10mmP/25mmE   5 Standard Dust Mite - D. Pteronyssinus 1:10 + 5mmP/8mmE   10 Aspergillus fumigatus  1:10 -     11 Penicillium notatum 1:10 -        Conclusion: strong sensitization to D. Farinae, less to D. Pteronyssinus and no immediate reaction to  molds    Atopy Screen (Placed Sep 20, 2022)  No Substance Readings (15 min) Evaluation   POS Histamine 1mg/ml ++    NEG NaCl 0.9% -      No Substance Readings (15 min) Evaluation   1 Alternaria alternata (tenuis)  -    2 Cladosporium herbarum -    3 Aspergillus fumigatus -    4 Penicillium notatum -    5 Dermatophagoides pteronyssinus -    6 Dermatophagoides farinae -    7 Dog epithelium (canis spp) -    8 Cat hair (isa catus) -    9 Cockroach   (Blatella americana & germanica) ++    10 Grass mix midwest   (Sushila, Orchard, Redtop, Toney) ++    11 Miguel grass (sorghum halepense) ++    12 Weed mix   (common Cocklebur, Lamb s quarters, rough redroot Pigweed, Dock/Sorrel) (+)    13 Mug wort (artemisia vulgare) +    14 Ragweed giant/short (ambrosia spp) +    15 White birch (Betula papyrifera) ++    16 Tree mix 1 (Pecan, Maple BHR, Oak RVW, american [place], black [place]) +/++    17 Red cedar (juniperus virginia) -    18 Tree mix 2   (white Nic, river/red Birch, black [place], common [place], american Elm) -    19 Box elder/Maple mix (acer spp) -    20 [place] shagbark (carya ovata) ++           Conclusion: some reduction of reactivity to tree pollens and as expected prick tests negatives to dust mites (however, cockroach positive). The grass pollen allergy still strong like 2 years ago, but still clinically not very relevant       Assessment & Plan:    ==> Final Diagnosis:     # perennial Rhinitis and postnasal drip mostly night/morning  * chronic illness with exacerbation, progression, side effects from treatment    In intradermal tests immediate type sensitization to house dust mites (not molds)  --> reduce house dust mites = info given    >> seasonal Rhinoconjunctivitis in spring with sensitization to tree pollens    Probably clinically less relevant to grass pollens  * chronic illness with exacerbation, progression, side effects from treatment      # lump feeling in neck --> probably not allergic  * acute  illness with exacerbation, progression, side effects from treatment  Referral to ENT    These conclusions are made at the best of one's knowledge and belief based on the provided evidence such as patient's history and allergy test results and they can change over time or can be incomplete because of missing information's.      ==> Treatment Plan:    >> continue Immunotherapy for following extracts:    - house dust mites ALK standardized 50% D. F. And 50% D.p.STMM 10,000 AU right arm = continue monthly HOUSE DUST MITES    --> symptoms in winter still there, but improved = still some reactions after IT    - tree pollen mix left arm center Al 9 tree mix (Helen, White Nic, Black Birch, American Elm, Shagbark, Hickory, Maple (Sugar), White Oak, White [place], American [place]) ==> now 3 years in IT and center Al not anymore available = stop for the moment (had less skin reactions recently after injections)    ==> discuss maybe in winter or spring about pollen IT and maybe then repeat prick tests (Guinean extracts?)    >> maybe take Cetirizine 10mg every evening and try to stop Flonase    Patient counseling:  About IT, possible side effects (Urticaria, Anaphylaxis, Asthma, local reactions) and that patient has to wait after each shot 30min and that first 4-5 months weekly and then monthly. Explained as well emergency set (without the Epipen!) and that it should be with patient the day of IT (given handout in wrap up).    Procedures Performed: Allergy tests, including prick tests    Staff: : provider    Follow-up in Derm-Allergy clinic for IT ==> in about 1 year re-evaluation IT  ___________________________    I spent a total of 25 minutes with José Luis Palacios during today s  visit. This time was spent discussing all the individual test results, correlating them to the clinical relevance, counseling the patient and/or coordinating care and performing allergy tests or procedures.     Procedures Performed: Allergy  tests, including prick, intradermal and patch tests and drug allergy or provocation tests***    {[id number]}: provider    Follow-up in Derm-Allergy clinic ***  ___________________________    I spent a total of 20 minutes with José Luis Palacios during today s  visit. This time was spent discussing all the individual test results, correlating them to the clinical relevance, counseling the patient and/or coordinating care and performing allergy tests or procedures.           Again, thank you for allowing me to participate in the care of your patient.        Sincerely,        Manish Vides MD

## 2023-06-20 NOTE — NURSING NOTE
Chief Complaint   Patient presents with     Allergy Testing Followup     Pt is here for an allergy follow up. Pt has been having pressure to neck/throat for past week, possible swelling?      Brittany SERRANO RN

## 2023-07-14 ENCOUNTER — TELEPHONE (OUTPATIENT)
Dept: DERMATOLOGY | Facility: CLINIC | Age: 28
End: 2023-07-14

## 2023-07-14 ENCOUNTER — ALLIED HEALTH/NURSE VISIT (OUTPATIENT)
Dept: ALLERGY | Facility: CLINIC | Age: 28
End: 2023-07-14
Payer: COMMERCIAL

## 2023-07-14 DIAGNOSIS — Z91.09 HOUSE DUST MITE ALLERGY: Primary | ICD-10-CM

## 2023-07-14 DIAGNOSIS — H10.10 ALLERGIC RHINOCONJUNCTIVITIS: ICD-10-CM

## 2023-07-14 DIAGNOSIS — R09.82 ALLERGIC RHINITIS WITH POSTNASAL DRIP: ICD-10-CM

## 2023-07-14 DIAGNOSIS — J30.9 ALLERGIC RHINOCONJUNCTIVITIS: ICD-10-CM

## 2023-07-14 DIAGNOSIS — J30.9 ALLERGIC RHINITIS WITH POSTNASAL DRIP: ICD-10-CM

## 2023-07-14 DIAGNOSIS — Z51.6 NEED FOR DESENSITIZATION TO ALLERGENS: ICD-10-CM

## 2023-07-14 PROCEDURE — 99207 PR NO CHARGE NURSE ONLY: CPT | Performed by: DERMATOLOGY

## 2023-07-14 PROCEDURE — 95125 IMMUNOTHERAPY 2/> INJECTIONS: CPT | Performed by: DERMATOLOGY

## 2023-07-14 NOTE — NURSING NOTE
Patient came in for 2 allergy injection(s). Patient had no reaction to last shots. I gave 0.3 ml of the red bottle T subcutaneous and 0.5mL of the red bottle DM subcutaneous without any issues.   Patient instructed to wait in building for at least 30 min. after injections  Patient comes into clinic today at the request of Dr. Vides for allergy injections     This service provided today was under the direct supervision of Dr. Vides, who was available if needed.

## 2023-07-31 ENCOUNTER — THERAPY VISIT (OUTPATIENT)
Dept: ALLERGY | Facility: CLINIC | Age: 28
End: 2023-07-31
Payer: COMMERCIAL

## 2023-07-31 DIAGNOSIS — Z91.09 HOUSE DUST MITE ALLERGY: Primary | ICD-10-CM

## 2023-07-31 DIAGNOSIS — R09.82 ALLERGIC RHINITIS WITH POSTNASAL DRIP: ICD-10-CM

## 2023-07-31 DIAGNOSIS — Z51.6 NEED FOR DESENSITIZATION TO ALLERGENS: ICD-10-CM

## 2023-07-31 DIAGNOSIS — J30.9 ALLERGIC RHINITIS WITH POSTNASAL DRIP: ICD-10-CM

## 2023-07-31 NOTE — PROGRESS NOTES
Date of Immunotherapy mixing: Jul 31, 2023    Mix name: standardized dust mites 1:1    Dilution: red 1:1    Vial size: 5ml each dilution      Mixer name: Osiris Cabral CPhT    Units to bill: 10U     Provider attestation: Manish WIGGINS MD supervised the mixing of these extracts while on site

## 2023-08-04 ENCOUNTER — ALLIED HEALTH/NURSE VISIT (OUTPATIENT)
Dept: ALLERGY | Facility: CLINIC | Age: 28
End: 2023-08-04
Payer: COMMERCIAL

## 2023-08-04 DIAGNOSIS — Z51.6 NEED FOR DESENSITIZATION TO ALLERGENS: Primary | ICD-10-CM

## 2023-08-04 PROCEDURE — 95125 IMMUNOTHERAPY 2/> INJECTIONS: CPT | Performed by: DERMATOLOGY

## 2023-08-04 PROCEDURE — 99207 PR NO CHARGE NURSE ONLY: CPT | Performed by: DERMATOLOGY

## 2023-08-08 ENCOUNTER — ALLIED HEALTH/NURSE VISIT (OUTPATIENT)
Dept: ALLERGY | Facility: CLINIC | Age: 28
End: 2023-08-08
Payer: COMMERCIAL

## 2023-08-08 DIAGNOSIS — Z51.6 NEED FOR DESENSITIZATION TO ALLERGENS: Primary | ICD-10-CM

## 2023-08-08 PROCEDURE — 99207 PR NO CHARGE NURSE ONLY: CPT | Performed by: DERMATOLOGY

## 2023-08-08 PROCEDURE — 95120 IMMUNOTHERAPY ONE INJECTION: CPT | Performed by: DERMATOLOGY

## 2023-08-08 NOTE — NURSING NOTE
Chief Complaint   Patient presents with    Allergy Injection     Immunotherapy     Arina Moay RN     98.9

## 2023-08-14 ENCOUNTER — OFFICE VISIT (OUTPATIENT)
Dept: OTOLARYNGOLOGY | Facility: CLINIC | Age: 28
End: 2023-08-14
Attending: DERMATOLOGY
Payer: COMMERCIAL

## 2023-08-14 VITALS
BODY MASS INDEX: 23.38 KG/M2 | DIASTOLIC BLOOD PRESSURE: 76 MMHG | SYSTOLIC BLOOD PRESSURE: 144 MMHG | HEIGHT: 75 IN | TEMPERATURE: 97.4 F | WEIGHT: 188 LBS | HEART RATE: 71 BPM

## 2023-08-14 DIAGNOSIS — R22.1 LOCALIZED SWELLING, MASS AND LUMP, NECK: Primary | ICD-10-CM

## 2023-08-14 DIAGNOSIS — R09.A2 GLOBUS SENSATION: ICD-10-CM

## 2023-08-14 PROCEDURE — 99214 OFFICE O/P EST MOD 30 MIN: CPT | Mod: 25 | Performed by: REGISTERED NURSE

## 2023-08-14 PROCEDURE — 31575 DIAGNOSTIC LARYNGOSCOPY: CPT | Performed by: REGISTERED NURSE

## 2023-08-14 ASSESSMENT — PAIN SCALES - GENERAL: PAINLEVEL: NO PAIN (0)

## 2023-08-14 NOTE — PROGRESS NOTES
[facility]   Head and Neck Surgery  August 14, 2023    Referring Provider: Manish Vides MD      HPI: José Luis Palacios is a 28 year old male who presents today for evaluation of neck pressure and globus sensation. Patient states that symptoms started in June of this year. Reports and intermittent but persistent fullness or pressure in the anterior neck/throat. Seems to be worse right away in the morning and then before going to sleep at night. 'Weird' sensation when palpating the anterior neck. Patient denies any dysphagia, odynophagia, new sore throat, hemoptysis, voice changes or lumps/bumps of the neck.    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 Significant history of allergies, including post nasal drip, which has improved with allergy shots.     Past Medical History:  Past Medical History:   Diagnosis Date    Allergic rhinitis due to pollen     Amblyopia     Hx of kidney disease     ureter issue, resolved    Strabismus      Past Surgical History:  Past Surgical History:   Procedure Laterality Date    NO HISTORY OF SURGERY       Medications:  Current Outpatient Medications   Medication Sig Dispense Refill    fluticasone (FLONASE) 50 MCG/ACT nasal spray Spray 1 spray into both nostrils daily 16 g 1    loratadine (CLARITIN) 10 MG tablet Take 10 mg by mouth daily      Multiple Vitamin (MULTI VITAMIN DAILY PO) Take 1 tablet by mouth daily      ORDER FOR ALLERGEN IMMUNOTHERAPY Name of Mix: dust mite mix; [1:1 red] Ag #1/ Name Allergen: Std Mite, Mixed/ : ALK/ Concentration of Extract: 10,000 Au/ml / Total Antigen Volume: 5ml. 5 mL PRN    ORDER FOR ALLERGEN IMMUNOTHERAPY Name of Mix: Tree mix [stock solution 1:1 red]:  Birch 2ml, ,Nic 1ml, Box Elder 1ml, Oak white 0.5ml and Hickory shagbark 0.5ml   All Center Al ALK 10,000 PNU/ml   Total Volume 5 ml (HSA qs to 5ml)   Please provide following vials  red = 1:1   Deliver within  "the next 2 weeks  Order date 03/10/2023    Prescribing MD: Manish Vides Date of order:     Stock solution (red) 10,000AU  5ml    Please provide as well following dilutions (5ml each)  1) red = 1:1    Diluent: HSA 5 mL PRN    ORDER FOR ALLERGEN IMMUNOTHERAPY Name of Mix: ALK STMM standardized extract STMM  50% D. Farinae and 50% D. Pteronyssinus  Stock solution (red) 10,000AU  5ml    Please provide as well following dilutions (5ml each)  1) red = 1:1    Diluent: HSA 5 mL PRN    predniSONE (DELTASONE) 50 MG tablet Emergency set: if severe allergic reaction take immediately 100mg Prednisone (2x50mg) and 2 Tabl Allegra 180mg. 2 tablet 3    tretinoin (RETIN-A) 0.025 % external cream          Allergies:  No Known Allergies     Social History:  Social History     Tobacco Use    Smoking status: Never    Smokeless tobacco: Never   Vaping Use    Vaping Use: Never used   Substance Use Topics    Alcohol use: Yes     Alcohol/week: 1.0 - 5.0 standard drink of alcohol     Types: 1 - 5 Standard drinks or equivalent per week    Drug use: Yes     Types: Marijuana     Comment: occasionally, once/month     ROS: 10 point ROS neg other than the symptoms noted above in the HPI.    Physical Exam:    BP (!) 144/76   Pulse 71   Temp 97.4  F (36.3  C)   Ht 1.905 m (6' 3\")   Wt 85.3 kg (188 lb)   BMI 23.50 kg/m       Constitutional:  The patient was unaccompanied, well-groomed, and in no acute distress.     Neurologic: Alert and oriented x 3.  CN's III-XII within normal limits.  Voice normal.    Psychiatric: The patient's affect was calm, cooperative, and appropriate.     Communication:  Normal; communicates verbally, normal voice quality.    Respiratory: Breathing comfortably without stridor or exertion of accessory muscles.    Head/Face:  Normocephalic and atraumatic.  No lesions or scars.   Salivary glands -  Normal size, no tenderness, swelling, or palpable masses    Ears: Pinnae and tragus non-tender.  EAC's and TM's were clear.   "   Oral Cavity: Normal tongue, floor of mouth, buccal mucosa, and palate.  No lesions or masses on inspection.   Oropharynx: Normal mucosa, palate symmetric with normal elevation. No abnormal lymph tissue in the oropharynx. Erythema of the posterior oropharynx.   Neck: Supple with normal laryngeal and tracheal landmarks.  The parotid beds were without masses.  No palpable thyroid.  Normal range of motion. Slight discomfort and pressure sensation when palpating anterior neck.   Lymphatic: There is no palpable lymphadenopathy in the neck.      Flexible fiberoptic laryngoscopy: Scope exam was indicated due to throat symptoms. Verbal consent was obtained. The nasal cavity was prepped with an aerosolized solution of topical anesthetic and vasoconstrictive agent. The scope was passed through the anterior nasal cavity and advanced. Inspection of the nasopharynx revealed no gross abnormality. Hypertrophy of the lingual tonsils without ulceration, unable to fully visualize vallecula. The epiglottis, AE folds, false cords, true cords, arytenoids are normal. Inspection of the larynx revealed bilaterally mobile vocal cords. Pyriform sinuses are symmetric. The airway is patent. Procedure tolerated well with no immediate complications noted.      Assessment/Plan:  Patient with history of throat/neck fullness and pressure with possible globus sensation. Physical and scope exam without any concerning findings of masses or ulcerations. There is some erythema of the posterior wall of the oropharynx and fullness in the lingual tonsils that may be due to history of allergies and/or post nasal drip. This irritation may be contributing to symptoms, however, area of concern seems to be lower in the neck. Recommend treatment for laryngopharyngeal reflux. Daily PPI for 4 weeks. If patient does not find improvement of symptoms with treatment, patient should send a MyChart message for further interventions.     Suspect that symptoms may be  muscular in nature. If PPI does not improve symptoms, will send referral to [facility] and possibly physical therapy for muscle tension treatment.     Patient will use PPI for 4 weeks and update clinic via MyChart regarding symptoms.    Tessa Lindsey DNP, APRN, CNP  Otolaryngology  Head & Neck Surgery  [phone]    30 minutes spent on the date of the encounter doing chart review, history and exam, documentation and further activities per the note excluding time performing scope exam.

## 2023-08-14 NOTE — NURSING NOTE
"Chief Complaint   Patient presents with    RECHECK     Neck mass      Blood pressure (!) 144/76, pulse 71, temperature 97.4  F (36.3  C), height 1.905 m (6' 3\"), weight 85.3 kg (188 lb).    Edgard Song LPN    "

## 2023-08-14 NOTE — LETTER
8/14/2023       RE: José Luis Palacios  [address] [place] [address]     Dear Colleague,    Thank you for referring your patient, José Luis Palacios, to the [facility] EAR NOSE AND THROAT CLINIC [place] at [facility]. Please see a copy of my visit note below.    [facility] Ear, Nose and Throat [facility]   Head and Neck Surgery  August 14, 2023    Referring Provider: Manish Vides MD      HPI: José Luis Palacios is a 28 year old male who presents today for evaluation of neck pressure and globus sensation. Patient states that symptoms started in June of this year. Reports and intermittent but persistent fullness or pressure in the anterior neck/throat. Seems to be worse right away in the morning and then before going to sleep at night. 'Weird' sensation when palpating the anterior neck. Patient denies any dysphagia, odynophagia, new sore throat, hemoptysis, voice changes or lumps/bumps of the neck.    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 Significant history of allergies, including post nasal drip, which has improved with allergy shots.     Past Medical History:  Past Medical History:   Diagnosis Date     Allergic rhinitis due to pollen      Amblyopia      Hx of kidney disease     ureter issue, resolved     Strabismus      Past Surgical History:  Past Surgical History:   Procedure Laterality Date     NO HISTORY OF SURGERY       Medications:  Current Outpatient Medications   Medication Sig Dispense Refill     fluticasone (FLONASE) 50 MCG/ACT nasal spray Spray 1 spray into both nostrils daily 16 g 1     loratadine (CLARITIN) 10 MG tablet Take 10 mg by mouth daily       Multiple Vitamin (MULTI VITAMIN DAILY PO) Take 1 tablet by mouth daily       ORDER FOR ALLERGEN IMMUNOTHERAPY Name of Mix: dust mite mix; [1:1 red] Ag #1/ Name Allergen: Std Mite, Mixed/ : ALK/ Concentration of Extract:  "10,000 Au/ml / Total Antigen Volume: 5ml. 5 mL PRN     ORDER FOR ALLERGEN IMMUNOTHERAPY Name of Mix: Tree mix [stock solution 1:1 red]:  Birch 2ml, ,Nic 1ml, Box Elder 1ml, Oak white 0.5ml and Hickory shagbark 0.5ml   All Center Al ALK 10,000 PNU/ml   Total Volume 5 ml (HSA qs to 5ml)   Please provide following vials  red = 1:1   Deliver within the next 2 weeks  Order date 03/10/2023    Prescribing MD: Manish Vides Date of order:     Stock solution (red) 10,000AU  5ml    Please provide as well following dilutions (5ml each)  1) red = 1:1    Diluent: HSA 5 mL PRN     ORDER FOR ALLERGEN IMMUNOTHERAPY Name of Mix: ALK STMM standardized extract STMM  50% D. Farinae and 50% D. Pteronyssinus  Stock solution (red) 10,000AU  5ml    Please provide as well following dilutions (5ml each)  1) red = 1:1    Diluent: HSA 5 mL PRN     predniSONE (DELTASONE) 50 MG tablet Emergency set: if severe allergic reaction take immediately 100mg Prednisone (2x50mg) and 2 Tabl Allegra 180mg. 2 tablet 3     tretinoin (RETIN-A) 0.025 % external cream          Allergies:  No Known Allergies     Social History:  Social History     Tobacco Use     Smoking status: Never     Smokeless tobacco: Never   Vaping Use     Vaping Use: Never used   Substance Use Topics     Alcohol use: Yes     Alcohol/week: 1.0 - 5.0 standard drink of alcohol     Types: 1 - 5 Standard drinks or equivalent per week     Drug use: Yes     Types: Marijuana     Comment: occasionally, once/month     ROS: 10 point ROS neg other than the symptoms noted above in the HPI.    Physical Exam:    BP (!) 144/76   Pulse 71   Temp 97.4  F (36.3  C)   Ht 1.905 m (6' 3\")   Wt 85.3 kg (188 lb)   BMI 23.50 kg/m       Constitutional:  The patient was unaccompanied, well-groomed, and in no acute distress.     Neurologic: Alert and oriented x 3.  CN's III-XII within normal limits.  Voice normal.    Psychiatric: The patient's affect was calm, cooperative, and appropriate.     Communication:  " Normal; communicates verbally, normal voice quality.    Respiratory: Breathing comfortably without stridor or exertion of accessory muscles.    Head/Face:  Normocephalic and atraumatic.  No lesions or scars.   Salivary glands -  Normal size, no tenderness, swelling, or palpable masses    Ears: Pinnae and tragus non-tender.  EAC's and TM's were clear.     Oral Cavity: Normal tongue, floor of mouth, buccal mucosa, and palate.  No lesions or masses on inspection.   Oropharynx: Normal mucosa, palate symmetric with normal elevation. No abnormal lymph tissue in the oropharynx. Erythema of the posterior oropharynx.   Neck: Supple with normal laryngeal and tracheal landmarks.  The parotid beds were without masses.  No palpable thyroid.  Normal range of motion. Slight discomfort and pressure sensation when palpating anterior neck.   Lymphatic: There is no palpable lymphadenopathy in the neck.      Flexible fiberoptic laryngoscopy: Scope exam was indicated due to throat symptoms. Verbal consent was obtained. The nasal cavity was prepped with an aerosolized solution of topical anesthetic and vasoconstrictive agent. The scope was passed through the anterior nasal cavity and advanced. Inspection of the nasopharynx revealed no gross abnormality. Hypertrophy of the lingual tonsils without ulceration, unable to fully visualize vallecula. The epiglottis, AE folds, false cords, true cords, arytenoids are normal. Inspection of the larynx revealed bilaterally mobile vocal cords. Pyriform sinuses are symmetric. The airway is patent. Procedure tolerated well with no immediate complications noted.      Assessment/Plan:  Patient with history of throat/neck fullness and pressure with possible globus sensation. Physical and scope exam without any concerning findings of masses or ulcerations. There is some erythema of the posterior wall of the oropharynx and fullness in the lingual tonsils that may be due to history of allergies and/or post  nasal drip. This irritation may be contributing to symptoms, however, area of concern seems to be lower in the neck. Recommend treatment for laryngopharyngeal reflux. Daily PPI for 4 weeks. If patient does not find improvement of symptoms with treatment, patient should send a [employer] message for further interventions.     Suspect that symptoms may be muscular in nature. If PPI does not improve symptoms, will send referral to [facility] and possibly physical therapy for muscle tension treatment.     Patient will use PPI for 4 weeks and update clinic via [employer] regarding symptoms.    Tessa Lindsey DNP, APRN, CNP  Otolaryngology  Head & Neck Surgery  [phone]    30 minutes spent on the date of the encounter doing chart review, history and exam, documentation and further activities per the note excluding time performing scope exam.      Again, thank you for allowing me to participate in the care of your patient.      Sincerely,    Jyoti Lindsey, NP

## 2023-08-15 ENCOUNTER — MYC MEDICAL ADVICE (OUTPATIENT)
Dept: ALLERGY | Facility: CLINIC | Age: 28
End: 2023-08-15
Payer: COMMERCIAL

## 2023-08-18 ENCOUNTER — ALLIED HEALTH/NURSE VISIT (OUTPATIENT)
Dept: ALLERGY | Facility: CLINIC | Age: 28
End: 2023-08-18
Payer: COMMERCIAL

## 2023-08-18 DIAGNOSIS — Z51.6 NEED FOR DESENSITIZATION TO ALLERGENS: Primary | ICD-10-CM

## 2023-08-18 PROCEDURE — 95120 IMMUNOTHERAPY ONE INJECTION: CPT | Performed by: DERMATOLOGY

## 2023-08-18 PROCEDURE — 99207 PR NO CHARGE NURSE ONLY: CPT | Performed by: DERMATOLOGY

## 2023-09-05 ENCOUNTER — ALLIED HEALTH/NURSE VISIT (OUTPATIENT)
Dept: ALLERGY | Facility: CLINIC | Age: 28
End: 2023-09-05
Payer: COMMERCIAL

## 2023-09-05 DIAGNOSIS — Z51.6 NEED FOR DESENSITIZATION TO ALLERGENS: Primary | ICD-10-CM

## 2023-09-05 PROCEDURE — 95120 IMMUNOTHERAPY ONE INJECTION: CPT | Performed by: DERMATOLOGY

## 2023-09-05 PROCEDURE — 99207 PR NO CHARGE NURSE ONLY: CPT | Performed by: DERMATOLOGY

## 2023-09-05 NOTE — NURSING NOTE
Patient came in for 1 allergy injection(s). Patient had no reaction to last shots. I gave 0.5 ml of the red bottle DM subcutaneous without any issues.   Patient instructed to wait in building for at least 30 min. after injections  Patient comes into clinic today at the request of Dr. Vides for allergy injections     This service provided today was under the direct supervision of Dr. Vides, who was available if needed.

## 2023-10-04 ENCOUNTER — TELEPHONE (OUTPATIENT)
Dept: ALLERGY | Facility: CLINIC | Age: 28
End: 2023-10-04
Payer: COMMERCIAL

## 2023-10-04 NOTE — TELEPHONE ENCOUNTER
[facility] Call Center    Phone Message    May a detailed message be left on voicemail: yes     Reason for Call: Patient needs to go over the type of allergy injections he gets - patient states some injections are are just regular serum and some are classified as derm visit due to compounding processs for injection - which is correct? Insurance bill differently for both appts and the compounding appts are $500.oo each - please call back [phone] Thank you  Patient needs info before Friday appt    Action Taken: Message routed to:  [facility] ([facility]): All    Travel Screening: Not Applicable

## 2023-12-16 ENCOUNTER — HEALTH MAINTENANCE LETTER (OUTPATIENT)
Age: 28
End: 2023-12-16

## 2024-01-28 ENCOUNTER — HOSPITAL ENCOUNTER (EMERGENCY)
Facility: CLINIC | Age: 29
Discharge: HOME | End: 2024-01-28
Attending: EMERGENCY MEDICINE | Admitting: EMERGENCY MEDICINE
Payer: COMMERCIAL

## 2024-01-28 VITALS
OXYGEN SATURATION: 96 % | RESPIRATION RATE: 16 BRPM | HEIGHT: 75 IN | WEIGHT: 195.3 LBS | DIASTOLIC BLOOD PRESSURE: 80 MMHG | TEMPERATURE: 97.5 F | BODY MASS INDEX: 24.28 KG/M2 | SYSTOLIC BLOOD PRESSURE: 135 MMHG | HEART RATE: 83 BPM

## 2024-01-28 DIAGNOSIS — S61.211A LACERATION OF LEFT INDEX FINGER WITHOUT FOREIGN BODY WITHOUT DAMAGE TO NAIL, INITIAL ENCOUNTER: ICD-10-CM

## 2024-01-28 PROCEDURE — 250N000009 HC RX 250

## 2024-01-28 PROCEDURE — 12001 RPR S/N/AX/GEN/TRNK 2.5CM/<: CPT | Performed by: EMERGENCY MEDICINE

## 2024-01-28 PROCEDURE — 99283 EMERGENCY DEPT VISIT LOW MDM: CPT | Performed by: EMERGENCY MEDICINE

## 2024-01-28 PROCEDURE — 12001 RPR S/N/AX/GEN/TRNK 2.5CM/<: CPT | Mod: F6 | Performed by: EMERGENCY MEDICINE

## 2024-01-28 PROCEDURE — 99283 EMERGENCY DEPT VISIT LOW MDM: CPT | Mod: 25 | Performed by: EMERGENCY MEDICINE

## 2024-01-28 RX ORDER — LIDOCAINE HYDROCHLORIDE AND EPINEPHRINE 10; 10 MG/ML; UG/ML
INJECTION, SOLUTION INFILTRATION; PERINEURAL
Status: COMPLETED
Start: 2024-01-28 | End: 2024-01-28

## 2024-01-28 RX ORDER — LIDOCAINE HYDROCHLORIDE 20 MG/ML
40 INJECTION, SOLUTION INFILTRATION; PERINEURAL ONCE
Status: DISCONTINUED | OUTPATIENT
Start: 2024-01-28 | End: 2024-01-28 | Stop reason: HOSPADM

## 2024-01-28 RX ADMIN — LIDOCAINE HYDROCHLORIDE AND EPINEPHRINE: 10; 10 INJECTION, SOLUTION INFILTRATION; PERINEURAL at 03:30

## 2024-01-28 ASSESSMENT — ACTIVITIES OF DAILY LIVING (ADL)
ADLS_ACUITY_SCORE: 33
ADLS_ACUITY_SCORE: 35

## 2024-01-28 NOTE — ED TRIAGE NOTES
Pt. Said he was at the bar with his wife. They were clicking beer  the beer bottle broke. Patient said it happened 15 minutes ago. Patient can not remember his last Tetanus shot.       Triage Assessment (Adult)       Row Name 01/28/24 0026          Triage Assessment    Airway WDL WDL        Respiratory WDL    Respiratory WDL WDL        Skin Circulation/Temperature WDL    Skin Circulation/Temperature WDL X  laceration to right 2nd finger        Cardiac WDL    Cardiac WDL WDL        Peripheral/Neurovascular WDL    Peripheral Neurovascular WDL WDL        Cognitive/Neuro/Behavioral WDL    Cognitive/Neuro/Behavioral WDL WDL

## 2024-01-28 NOTE — DISCHARGE INSTRUCTIONS
Return to the emergency department for any worsening redness, swelling, drainage, pain, fever or any other concerns as given or discussed.     6 sutures, to be removed in 7 days    You can take tylenol as needed for pain. The maximum daily dose is 4000 mg and the maximum single dose at a time is 1000mg. For your condition, your should take 1000mg every 6 hours as needed for pain.    You can take ibuprofen for pain. The maximum daily dose is 2400 mg and the maximum single dose as a time is 800mg. For your condition, your should take 600mg every 4 hours as needed for pain.

## 2024-01-28 NOTE — ED PROVIDER NOTES
"      ED Provider Note  January 28, 2024  [facility]  [address]    History     Chief Complaint: Laceration (Right 2nd finger)      The history is provided by the patient and medical records.     José Luis Palacios is a 28 year old male presenting to the ED with a laceration to his right hand.  He was toasting / \"cheers\" with a bottle of beer with his friend when the bottle broke and the broken glass accidentally cut the skin over right 2nd digit just distal to the MCP. He covered it with napkins and came right here.      Per [facility] records, his last Tdap was in 2016.    Past Medical History  Past Medical History:   Diagnosis Date    Allergic rhinitis due to pollen     Amblyopia     Hx of kidney disease     ureter issue, resolved    Strabismus      Past Surgical History:   Procedure Laterality Date    NO HISTORY OF SURGERY       fluticasone (FLONASE) 50 MCG/ACT nasal spray  loratadine (CLARITIN) 10 MG tablet  Multiple Vitamin (MULTI VITAMIN DAILY PO)  ORDER FOR ALLERGEN IMMUNOTHERAPY  ORDER FOR ALLERGEN IMMUNOTHERAPY  ORDER FOR ALLERGEN IMMUNOTHERAPY  predniSONE (DELTASONE) 50 MG tablet  tretinoin (RETIN-A) 0.025 % external cream      No Known Allergies  Family History  Family History   Problem Relation Age of Onset    Hypertension Father     Mitral valve prolapse Father         regurg    Skin Cancer Mother         sun related    No Known Problems Sister     No Known Problems Maternal Grandmother     Hypertension Maternal Grandfather     Breast Cancer Paternal Grandmother     Coronary Artery Disease Paternal Grandfather         heart attack    Suicide Maternal Uncle     Glaucoma No family hx of     Macular Degeneration No family hx of      Social History   Social History     Tobacco Use    Smoking status: Never    Smokeless tobacco: Never   Vaping Use    Vaping Use: Never used   Substance Use Topics    Alcohol use: Yes     Alcohol/week: 1.0 - 5.0 standard drink of alcohol     " "Types: 1 - 5 Standard drinks or equivalent per week     Comment: occ    Drug use: Yes     Types: Marijuana     Comment: occasionally, once/month        Past medical history, past surgical history, medications, allergies, family history, and social history were reviewed with the patient. No additional pertinent items.      A medically appropriate review of systems was performed with pertinent positives and negatives noted in the HPI, and all other systems negative.    Physical Exam   /80   Pulse 83   Temp 97.5  F (36.4  C) (Oral)   Resp 16   Ht 1.905 m (6' 3\")   Wt 88.6 kg (195 lb 4.8 oz)   SpO2 96%   BMI 24.41 kg/m      GEN: Well appearing, non toxic, cooperative and conversant.   HEENT: The head is normocephalic and atraumatic. Pupils are equal round and reactive to light. Extraocular motions are intact. There is no facial swelling.   CV: Regular rate   PULM: Unlabored breathing     EXT: Full range of motion.  2nd digit right ext, fdp, fds intact.   NEURO: Cranial nerves II through XII are intact and symmetric. Bilateral upper and lower extremities grossly show full range of motion without any focal deficits.   1.5 cm laceration distal to 5 2nd mcp, full thickness, no tendon involvement or deep penetration, no FB or contamination.   PSYCH: Calm and cooperative, interactive.     ED Course, Procedures, & Data      Procedures              No results found for any visits on 01/28/24.  Medications   lidocaine injection 2% (MDV) (has no administration in time range)   lidocaine 1% with EPINEPHrine 1:100,000 1 %-1:[id number] injection (has no administration in time range)     Labs Ordered and Resulted from Time of ED Arrival to Time of ED Departure - No data to display  No orders to display        Patient was consented after discussing the risks, benefits and alternatives. Patient wishes to proceed with the procedure.  The laceration site was identified-   measuring 1.5 cm  Site prepped with chlorhexidine and " anesthetized with 2 of 1% lidocaine, then irrigated with 1L sterile saline.   Sterile drape placed after site reprepped with chlorhexidine again.   Site was examined for tendon injury, foreign body, gross contamination or need for debridement and none was found.   Using 5-0 Ethilon suture material, the site was repaired placing 6 simple interrupted sutures in close approximation.  This was a simple repair  The patient tolerated the procedure well.        Medical Decision Making Matrix    Problems Addressed  MDM Moderate: 1 acute, complicated injury      Data   Considered  Data Moderate: Order of (or considering) each unique test (Cat 1) and Review of the results of each unique test (Cat 1)      Risk of Patient Management Low Risk: Low Risk                 Assessment & Plan    Finger laceration as noted above, repaired with suture as above,   Neuro vasc and function intact.   Low risk for infection  Otc analgesics prn   Suture removal in 7 days     - Patient agrees to our plan and is ready and eager for discharge. Care plan, follow up plan, and reasons to return immediately to the ED were dicussed in detail and summarized as noted in the discharge instructions.        I have reviewed the nursing notes. I have reviewed the findings, diagnosis, plan and need for follow up with the patient.    New Prescriptions    No medications on file       Final diagnoses:   Laceration of right index finger without foreign body without damage to nail, initial encounter       Terence Unger MD  [facility] EMERGENCY DEPARTMENT  January 28, 2024       Terence Unger MD  01/28/24 [id number]

## 2024-02-06 ENCOUNTER — OFFICE VISIT (OUTPATIENT)
Dept: URGENT CARE | Facility: URGENT CARE | Age: 29
End: 2024-02-06
Payer: COMMERCIAL

## 2024-02-06 VITALS
RESPIRATION RATE: 16 BRPM | BODY MASS INDEX: 24.1 KG/M2 | WEIGHT: 192.8 LBS | TEMPERATURE: 98.6 F | DIASTOLIC BLOOD PRESSURE: 74 MMHG | SYSTOLIC BLOOD PRESSURE: 142 MMHG | HEART RATE: 77 BPM | OXYGEN SATURATION: 98 %

## 2024-02-06 DIAGNOSIS — S61.210A LACERATION OF RIGHT INDEX FINGER WITHOUT FOREIGN BODY WITHOUT DAMAGE TO NAIL, INITIAL ENCOUNTER: Primary | ICD-10-CM

## 2024-02-06 PROCEDURE — 99213 OFFICE O/P EST LOW 20 MIN: CPT | Performed by: PHYSICIAN ASSISTANT

## 2024-02-06 ASSESSMENT — PAIN SCALES - GENERAL: PAINLEVEL: NO PAIN (0)

## 2024-02-06 NOTE — PROGRESS NOTES
SUBJECTIVE:  José Luis Palacios is a 28 year old male who presents to the clinic today for wound re-opening after suture removal 2 days ago.  Patient removed sutures himself at 1 week point.  Realized on completion he may have been over-eager as the wouund still had some opening despite 7 days reached.       Past Medical History:   Diagnosis Date    Allergic rhinitis due to pollen     Amblyopia     Hx of kidney disease     ureter issue, resolved    Strabismus      Current Outpatient Medications   Medication Sig Dispense Refill    fluticasone (FLONASE) 50 MCG/ACT nasal spray Spray 1 spray into both nostrils daily 16 g 1    loratadine (CLARITIN) 10 MG tablet Take 10 mg by mouth daily      Multiple Vitamin (MULTI VITAMIN DAILY PO) Take 1 tablet by mouth daily      ORDER FOR ALLERGEN IMMUNOTHERAPY Name of Mix: dust mite mix; [1:1 red] Ag #1/ Name Allergen: Std Mite, Mixed/ : ALK/ Concentration of Extract: 10,000 Au/ml / Total Antigen Volume: 5ml. 5 mL PRN    ORDER FOR ALLERGEN IMMUNOTHERAPY Name of Mix: Tree mix [stock solution 1:1 red]:  Birch 2ml, ,Nic 1ml, Box Elder 1ml, Oak white 0.5ml and Hickory shagbark 0.5ml   All Center Al ALK 10,000 PNU/ml   Total Volume 5 ml (HSA qs to 5ml)   Please provide following vials  red = 1:1   Deliver within the next 2 weeks  Order date 03/10/2023    Prescribing MD: Manish Vides Date of order:     Stock solution (red) 10,000AU  5ml    Please provide as well following dilutions (5ml each)  1) red = 1:1    Diluent: HSA 5 mL PRN    ORDER FOR ALLERGEN IMMUNOTHERAPY Name of Mix: ALK STMM standardized extract STMM  50% D. Farinae and 50% D. Pteronyssinus  Stock solution (red) 10,000AU  5ml    Please provide as well following dilutions (5ml each)  1) red = 1:1    Diluent: HSA 5 mL PRN    predniSONE (DELTASONE) 50 MG tablet Emergency set: if severe allergic reaction take immediately 100mg Prednisone (2x50mg) and 2 Tabl Allegra 180mg. 2 tablet 3    tretinoin (RETIN-A)  0.025 % external cream        Social History     Tobacco Use    Smoking status: Never    Smokeless tobacco: Never   Substance Use Topics    Alcohol use: Yes     Alcohol/week: 1.0 - 5.0 standard drink of alcohol     Types: 1 - 5 Standard drinks or equivalent per week     Comment: occ       ROS:  Review of systems negative except as stated above.    EXAM:   BP (!) 142/74 (BP Location: Right arm, Patient Position: Sitting, Cuff Size: Adult Large)   Pulse 77   Temp 98.6  F (37  C) (Oral)   Resp 16   Wt 87.5 kg (192 lb 12.8 oz)   SpO2 98%   BMI 24.10 kg/m    GENERAL: alert, no acute distress.  SKIN: edges have , deeply adhered. No erythema, discharge, tenderness    ASSESSMENT:  (S61.210A) Laceration of right index finger without foreign body without damage to nail, initial encounter  (primary encounter diagnosis)  Comment: no evidence of infection  Plan: splint, OTC steristrips, monitor for infection

## 2024-06-26 NOTE — PROGRESS NOTES
Assessment/Plan:   Giles is a 29 year old male here for physical     Routine adult health maintenance  Physical completed today.  Up-to-date with immunizations.  No bloodwork indicated today.    Tuberculosis screening  Patient will need TB screening for school, order in and he will come back at a future date for this.  - Quantiferon TB Gold Plus       I have had an Advance Directives discussion with the patient.    Follow up: 1 year for physical, sooner as needed    Emi Rodriguez MD  [facility]      Subjective:     José Luis Palacios is a 29 year old male who presents for an annual exam.     Question 6/28/2024  1:45 PM CDT - Filed by Patient   In general, how would you rate your overall physical health? Excellent   Do you get at least 3 servings of foods that have calcium each day (dairy, green leafy vegetables, etc)? Yes   Do you have a special diet? Regular (no restrictions)   How many servings of fruits and vegetables do you eat daily? 4 or more   On average, how many sweetened beverages do you drink each day (Examples: soda, juice, sweet tea, etc.)? Do NOT count diet or artificially sweetened beverages. 0-1   On average, how many days per week do you engage in moderate to strenuous exercise (like a brisk walk)? 6 days   On average, how many minutes do you engage in exercise at this level? 60 min   How often do you get together with friends or relatives? Twice a week   Do you see a dentist two times every year? Yes   Do you feel stress - tense, restless, nervous, or anxious, or unable to sleep at night because your mind is troubled all the time - these days? Only a little   If you drink alcohol, do you typically have more than 3 drinks per day OR more than 7 drinks per week? Yes Abnormal    Do you use any substances not prescribed by a provider, out of habit or for other reasons? No   Have you had any new sexual partners since you were last tested for sexually transmitted infections,  including HIV? No   Do you have any questions about contraception (birth control) or family planning? No   Within the past 12 months, did you worry that your food would run out before you got money to buy more? No   Within the past 12 months, did the food you bought just not last and you didn t have money to get more? No   Do you have housing? (Housing is defined as stable permanent housing and does not include staying ouside in a car, in a tent, in an abandoned building, in an overnight shelter, or couch-surfing.) No   Are you worried about losing your housing? No   Within the past 12 months, has lack of transportation kept you from medical appointments, getting your medicines, non-medical meetings or appointments, work, or from getting things that you need? No   Within the past 12 months, have you or your family members you live with been unable to get utilities (heat, electricity) when it was really needed? No   Would you like to be contacted by a care coordinator to help with housing and/or utility needs? No     Question 6/28/2024  1:45 PM CDT - Filed by Patient   Please use the following picture for reference only:    1.  How often do you have a drink containing alcohol? Monthly or less   2.  How many drinks containing alcohol do you have on a typical day when you are drinking? 5 or 6   3.  How often do you have five or more drinks on one occasion? Monthly   Audit 2/3 Score (range: 0 - 8) 4     Question 6/28/2024  1:45 PM CDT - Filed by Patient   Please use the following picture for reference only:    4.  How often during the last year have you found that you were not able to stop drinking once you had started? Never   5.  How often during the last year have you failed to do what was normally expected of you because of drinking? Never   6.  How often during the last year have you needed a first drink in the morning to get yourself going after a heavy drinking session? Never   7.  How often during the last year  have you had a feeling of guilt or remorse after drinking? Never   8.  How often during the last year have you been unable to remember what happened the night before because of your drinking? Never     Question 6/28/2024  1:45 PM CDT - Filed by Patient   Please use the following picture for reference only:    9.  Have you or someone else been injured because of your drinking? No   10. Has a relative, friend, doctor or other health care worker been concerned about your drinking or suggested you cut down? No   TOTAL SCORE - AUDIT (range: 0 - 40) 5     Question 7/3/2024  9:53 AM CDT - Filed by Mary Ellen Estrella MA   Q1: Little interest or pleasure in doing things Not at all   Q2: Feeling down, depressed or hopeless Not at all   PHQ-2 Score (range: 0 - 6) 0       Health Maintenance reviewed:  Lipid Profile: no  Glucose Screen: no  Colonoscopy: no    Immunization History   Administered Date(s) Administered    COVID-19 12+ (2023-24) (Pfizer) 10/11/2023    COVID-19 Bivalent 12+ (Pfizer) 10/21/2022    COVID-19 MONOVALENT 12+ (Pfizer) 12/22/2020, 01/08/2021, 11/08/2021    DTP-Hib 1996, 1996, 1996    Dtp,hib Conjugate,hep B  08/15/1997, 05/25/2001    HEPATITIS A (PEDS 12M-18Y) 07/15/2008, 01/26/2009    HPV Quadrivalent 06/06/2014, 07/17/2014, 12/16/2014    HepB, Unspecified 04/16/2015    Hepatitis B, Adult 04/16/2015, 05/18/2015    Hepatitis B, Peds 1996, 1996, 1996    Influenza (H1N1) 01/10/2010    Influenza (IIV3) PF 10/01/2015    Influenza Vaccine >6 months,quad, PF 09/29/2016, 10/03/2017, 10/05/2019, 09/27/2021    Influenza Vaccine, 6+MO IM (QUADRIVALENT W/PRESERVATIVES) 10/21/2019    Influenza,INJ,MDCK,PF,Quad >6mo(Flucelvax) 10/09/2020, 10/21/2022, 10/03/2023    MMR 05/16/1997, 05/25/2001    Meningococcal ACWY (Menactra ) 07/15/2008    Meningococcal ACWY (Menveo ) 06/06/2014    OPV, unspecified 1996, 1996, 1996    Poliovirus, inactivated (IPV) 05/25/2001    TDAP  (Adacel,Boostrix) 06/07/2007, 05/31/2016    TDAP Vaccine (Adacel) 05/31/2016    TRIHIBIT (DTAP/HIB, <7y) 08/15/1997    Varicella 05/16/1997, 06/07/2007     Immunization status: up to date and documented.    Current Outpatient Medications   Medication Sig Dispense Refill    CREATINE MONOHYDRATE PO       Multiple Vitamin (MULTI VITAMIN DAILY PO) Take 1 tablet by mouth daily      tretinoin (RETIN-A) 0.025 % external cream        Past Medical History:   Diagnosis Date    Allergic rhinitis due to pollen     Amblyopia     Hx of kidney disease     ureter issue, resolved    Strabismus      Past Surgical History:   Procedure Laterality Date    NO HISTORY OF SURGERY       Patient has no known allergies.  Family History   Problem Relation Age of Onset    Hypertension Father         Mitral Valve Flutter    Mitral valve prolapse Father         regurg    Skin Cancer Mother         sun related    Osteoporosis Mother     No Known Problems Sister     No Known Problems Maternal Grandmother     Hypertension Maternal Grandfather     Breast Cancer Paternal Grandmother     Coronary Artery Disease Paternal Grandfather         heart attack    Hyperlipidemia Paternal Grandfather     Suicide Maternal Uncle     Depression Maternal Half-Brother     Anxiety Disorder Maternal Half-Brother     Mental Illness Maternal Half-Brother     Depression Maternal Half-Sister     Anxiety Disorder Maternal Half-Sister     Mental Illness Maternal Half-Sister     Glaucoma No family hx of     Macular Degeneration No family hx of      Social History     Socioeconomic History    Marital status:      Spouse name: Not on file    Number of children: Not on file    Years of education: Not on file    Highest education level: Not on file   Occupational History    Not on file   Tobacco Use    Smoking status: Never    Smokeless tobacco: Never   Vaping Use    Vaping status: Never Used   Substance and Sexual Activity    Alcohol use: Yes     Alcohol/week: 1.0 - 5.0  standard drink of alcohol     Types: 1 - 5 Standard drinks or equivalent per week     Comment: occ    Drug use: Yes     Types: Marijuana     Comment: occasionally, once/month    Sexual activity: Yes     Partners: Female     Birth control/protection: I.U.D.   Other Topics Concern    Parent/sibling w/ CABG, MI or angioplasty before 65F 55M? No   Social History Narrative    Works in nurse anesthetist school.  Climbing exerciser.  .  No children.       Social Determinants of Health     Financial Resource Strain: Low Risk  (6/28/2024)    Financial Resource Strain     Within the past 12 months, have you or your family members you live with been unable to get utilities (heat, electricity) when it was really needed?: No   Food Insecurity: Low Risk  (6/28/2024)    Food Insecurity     Within the past 12 months, did you worry that your food would run out before you got money to buy more?: No     Within the past 12 months, did the food you bought just not last and you didn t have money to get more?: No   Transportation Needs: Low Risk  (6/28/2024)    Transportation Needs     Within the past 12 months, has lack of transportation kept you from medical appointments, getting your medicines, non-medical meetings or appointments, work, or from getting things that you need?: No   Physical Activity: Sufficiently Active (6/28/2024)    Exercise Vital Sign     Days of Exercise per Week: 6 days     Minutes of Exercise per Session: 60 min   Stress: No Stress Concern Present (6/28/2024)    [place] [employer] - Occupational Stress Questionnaire     Feeling of Stress : Only a little   Social Connections: Unknown (6/28/2024)    Social Connection and Isolation Panel [NHANES]     Frequency of Communication with Friends and Family: Not on file     Frequency of Social Gatherings with Friends and Family: Twice a week     Attends [employer] Services: Not on file     Active Member of Clubs or Organizations: Not on file      "Attends Club or Organization Meetings: Not on file     Marital Status: Not on file   Interpersonal Safety: Low Risk  (7/3/2024)    Interpersonal Safety     Do you feel physically and emotionally safe where you currently live?: Yes     Within the past 12 months, have you been hit, slapped, kicked or otherwise physically hurt by someone?: No     Within the past 12 months, have you been humiliated or emotionally abused in other ways by your partner or ex-partner?: No   Housing Stability: High Risk (6/28/2024)    Housing Stability     Do you have housing? : No     Are you worried about losing your housing?: No       Review of Systems negative unless noted    Objective:        Vitals:    07/03/24 0954   BP: 120/70   Pulse: 85   Resp: 16   Temp: 97.4  F (36.3  C)   TempSrc: Temporal   SpO2: 98%   Weight: 89.6 kg (197 lb 8 oz)   Height: 1.905 m (6' 3\")   PainSc: No Pain (0)     Body mass index is 24.69 kg/m .    Physical Exam:  General Appearance: Alert, pleasant, appears stated age  Head: Normocephalic, without obvious abnormality  Eyes: PERRL, conjunctiva/corneas clear, EOM's intact  Ears: Normal TM's and external ear canals, both ears  Nose: Nares normal, septum midline,mucosa normal, no drainage  Throat: Lips, mucosa, and tongue normal; teeth and gums normal; oropharynx is clear  Neck: Supple,without lymphadenopathy, no thyromegaly or nodules noted  Lungs: Clear to auscultation bilaterally, respirations unlabored, no wheezing or crackles  Heart: Regular rate and rhythm, no murmur   Abdomen: Soft, non-tender, no masses, no organomegaly  Extremities: Extremities with strong and symmetric pulses, no cyanosis or edema  Skin: Skin color, texture normal, no rashes or lesions  Neurologic: Grossly normal, no focal deficits    "

## 2024-06-28 SDOH — HEALTH STABILITY: PHYSICAL HEALTH: ON AVERAGE, HOW MANY DAYS PER WEEK DO YOU ENGAGE IN MODERATE TO STRENUOUS EXERCISE (LIKE A BRISK WALK)?: 6 DAYS

## 2024-06-28 SDOH — HEALTH STABILITY: PHYSICAL HEALTH: ON AVERAGE, HOW MANY MINUTES DO YOU ENGAGE IN EXERCISE AT THIS LEVEL?: 60 MIN

## 2024-06-28 ASSESSMENT — SOCIAL DETERMINANTS OF HEALTH (SDOH): HOW OFTEN DO YOU GET TOGETHER WITH FRIENDS OR RELATIVES?: TWICE A WEEK

## 2024-07-03 ENCOUNTER — OFFICE VISIT (OUTPATIENT)
Dept: FAMILY MEDICINE | Facility: CLINIC | Age: 29
End: 2024-07-03
Payer: COMMERCIAL

## 2024-07-03 VITALS
RESPIRATION RATE: 16 BRPM | SYSTOLIC BLOOD PRESSURE: 120 MMHG | OXYGEN SATURATION: 98 % | BODY MASS INDEX: 24.56 KG/M2 | HEIGHT: 75 IN | TEMPERATURE: 97.4 F | HEART RATE: 85 BPM | DIASTOLIC BLOOD PRESSURE: 70 MMHG | WEIGHT: 197.5 LBS

## 2024-07-03 DIAGNOSIS — Z11.1 TUBERCULOSIS SCREENING: ICD-10-CM

## 2024-07-03 DIAGNOSIS — Z00.00 ROUTINE ADULT HEALTH MAINTENANCE: Primary | ICD-10-CM

## 2024-07-03 PROCEDURE — 99395 PREV VISIT EST AGE 18-39: CPT | Performed by: FAMILY MEDICINE

## 2024-07-03 ASSESSMENT — PAIN SCALES - GENERAL: PAINLEVEL: NO PAIN (0)

## 2024-11-18 ENCOUNTER — LAB (OUTPATIENT)
Dept: LAB | Facility: CLINIC | Age: 29
End: 2024-11-18
Payer: COMMERCIAL

## 2024-11-18 DIAGNOSIS — Z11.1 TUBERCULOSIS SCREENING: ICD-10-CM

## 2024-11-18 PROCEDURE — 36415 COLL VENOUS BLD VENIPUNCTURE: CPT

## 2024-11-18 PROCEDURE — 86481 TB AG RESPONSE T-CELL SUSP: CPT

## 2024-11-19 LAB
QUANTIFERON MITOGEN: 10 IU/ML
QUANTIFERON NIL TUBE: 0.03 IU/ML
QUANTIFERON TB1 TUBE: 0.05 IU/ML
QUANTIFERON TB2 TUBE: 0.04

## 2024-11-20 LAB
GAMMA INTERFERON BACKGROUND BLD IA-ACNC: 0.03 IU/ML
M TB IFN-G BLD-IMP: NEGATIVE
M TB IFN-G CD4+ BCKGRND COR BLD-ACNC: 9.97 IU/ML
MITOGEN IGNF BCKGRD COR BLD-ACNC: 0.01 IU/ML
MITOGEN IGNF BCKGRD COR BLD-ACNC: 0.02 IU/ML

## 2025-06-03 ENCOUNTER — PATIENT OUTREACH (OUTPATIENT)
Dept: CARE COORDINATION | Facility: CLINIC | Age: 30
End: 2025-06-03
Payer: COMMERCIAL

## 2025-06-11 ENCOUNTER — TRANSFERRED RECORDS (OUTPATIENT)
Dept: HEALTH INFORMATION MANAGEMENT | Facility: CLINIC | Age: 30
End: 2025-06-11
Payer: COMMERCIAL

## 2025-08-22 SDOH — HEALTH STABILITY: PHYSICAL HEALTH: ON AVERAGE, HOW MANY MINUTES DO YOU ENGAGE IN EXERCISE AT THIS LEVEL?: 40 MIN

## 2025-08-22 SDOH — HEALTH STABILITY: PHYSICAL HEALTH: ON AVERAGE, HOW MANY DAYS PER WEEK DO YOU ENGAGE IN MODERATE TO STRENUOUS EXERCISE (LIKE A BRISK WALK)?: 6 DAYS

## 2025-08-27 ENCOUNTER — OFFICE VISIT (OUTPATIENT)
Dept: FAMILY MEDICINE | Facility: CLINIC | Age: 30
End: 2025-08-27
Payer: COMMERCIAL

## 2025-08-27 VITALS
SYSTOLIC BLOOD PRESSURE: 130 MMHG | WEIGHT: 197 LBS | HEART RATE: 77 BPM | DIASTOLIC BLOOD PRESSURE: 75 MMHG | HEIGHT: 75 IN | OXYGEN SATURATION: 100 % | RESPIRATION RATE: 18 BRPM | TEMPERATURE: 97.8 F | BODY MASS INDEX: 24.49 KG/M2

## 2025-08-27 DIAGNOSIS — Z13.220 LIPID SCREENING: ICD-10-CM

## 2025-08-27 DIAGNOSIS — Z13.1 DIABETES MELLITUS SCREENING: ICD-10-CM

## 2025-08-27 DIAGNOSIS — Z00.00 ROUTINE ADULT HEALTH MAINTENANCE: Primary | ICD-10-CM

## 2025-08-27 DIAGNOSIS — L70.0 ACNE VULGARIS: ICD-10-CM

## 2025-08-27 LAB
ERYTHROCYTE [DISTWIDTH] IN BLOOD BY AUTOMATED COUNT: 12.3 % (ref 10–15)
EST. AVERAGE GLUCOSE BLD GHB EST-MCNC: 97 MG/DL
HBA1C MFR BLD: 5 % (ref 0–5.6)
HCT VFR BLD AUTO: 46.5 % (ref 40–53)
HGB BLD-MCNC: 15.4 G/DL (ref 13.3–17.7)
MCH RBC QN AUTO: 28.5 PG (ref 26.5–33)
MCHC RBC AUTO-ENTMCNC: 33.1 G/DL (ref 31.5–36.5)
MCV RBC AUTO: 86.1 FL (ref 78–100)
PLATELET # BLD AUTO: 275 10E3/UL (ref 150–450)
RBC # BLD AUTO: 5.4 10E6/UL (ref 4.4–5.9)
WBC # BLD AUTO: 9.46 10E3/UL (ref 4–11)

## 2025-08-27 PROCEDURE — 3078F DIAST BP <80 MM HG: CPT | Performed by: FAMILY MEDICINE

## 2025-08-27 PROCEDURE — 80048 BASIC METABOLIC PNL TOTAL CA: CPT | Performed by: FAMILY MEDICINE

## 2025-08-27 PROCEDURE — 83036 HEMOGLOBIN GLYCOSYLATED A1C: CPT | Performed by: FAMILY MEDICINE

## 2025-08-27 PROCEDURE — 80061 LIPID PANEL: CPT | Performed by: FAMILY MEDICINE

## 2025-08-27 PROCEDURE — 85027 COMPLETE CBC AUTOMATED: CPT | Performed by: FAMILY MEDICINE

## 2025-08-27 PROCEDURE — 99395 PREV VISIT EST AGE 18-39: CPT | Performed by: FAMILY MEDICINE

## 2025-08-27 PROCEDURE — 36415 COLL VENOUS BLD VENIPUNCTURE: CPT | Performed by: FAMILY MEDICINE

## 2025-08-27 PROCEDURE — 3075F SYST BP GE 130 - 139MM HG: CPT | Performed by: FAMILY MEDICINE

## 2025-08-27 RX ORDER — TRETINOIN 0.5 MG/G
CREAM TOPICAL AT BEDTIME
Qty: 45 G | Refills: 3 | Status: SHIPPED | OUTPATIENT
Start: 2025-08-27

## 2025-08-28 LAB
ANION GAP SERPL CALCULATED.3IONS-SCNC: 12 MMOL/L (ref 7–15)
BUN SERPL-MCNC: 16.4 MG/DL (ref 6–20)
CALCIUM SERPL-MCNC: 9.4 MG/DL (ref 8.8–10.4)
CHLORIDE SERPL-SCNC: 100 MMOL/L (ref 98–107)
CHOLEST SERPL-MCNC: 131 MG/DL
CREAT SERPL-MCNC: 1.04 MG/DL (ref 0.67–1.17)
EGFRCR SERPLBLD CKD-EPI 2021: >90 ML/MIN/1.73M2
FASTING STATUS PATIENT QL REPORTED: NO
FASTING STATUS PATIENT QL REPORTED: NO
GLUCOSE SERPL-MCNC: 92 MG/DL (ref 70–99)
HCO3 SERPL-SCNC: 28 MMOL/L (ref 22–29)
HDLC SERPL-MCNC: 38 MG/DL
LDLC SERPL CALC-MCNC: 74 MG/DL
NONHDLC SERPL-MCNC: 93 MG/DL
POTASSIUM SERPL-SCNC: 4.6 MMOL/L (ref 3.4–5.3)
SODIUM SERPL-SCNC: 140 MMOL/L (ref 135–145)
TRIGL SERPL-MCNC: 94 MG/DL